# Patient Record
Sex: FEMALE | Race: BLACK OR AFRICAN AMERICAN | NOT HISPANIC OR LATINO | Employment: FULL TIME | URBAN - METROPOLITAN AREA
[De-identification: names, ages, dates, MRNs, and addresses within clinical notes are randomized per-mention and may not be internally consistent; named-entity substitution may affect disease eponyms.]

---

## 2023-07-03 ENCOUNTER — TELEMEDICINE (OUTPATIENT)
Dept: PRIMARY CARE | Facility: CLINIC | Age: 48
End: 2023-07-03
Payer: COMMERCIAL

## 2023-07-03 DIAGNOSIS — F32.A ANXIETY AND DEPRESSION: ICD-10-CM

## 2023-07-03 DIAGNOSIS — Z12.11 COLON CANCER SCREENING: Primary | ICD-10-CM

## 2023-07-03 DIAGNOSIS — G95.0 SYRINGOMYELIA (MULTI): ICD-10-CM

## 2023-07-03 DIAGNOSIS — D57.3 SICKLE-CELL TRAIT (CMS-HCC): ICD-10-CM

## 2023-07-03 DIAGNOSIS — F41.9 ANXIETY AND DEPRESSION: ICD-10-CM

## 2023-07-03 DIAGNOSIS — R10.84 GENERALIZED ABDOMINAL PAIN: ICD-10-CM

## 2023-07-03 DIAGNOSIS — M06.4 INFLAMMATORY POLYARTHRITIS (MULTI): ICD-10-CM

## 2023-07-03 PROCEDURE — 99213 OFFICE O/P EST LOW 20 MIN: CPT | Performed by: INTERNAL MEDICINE

## 2023-07-03 RX ORDER — BUSPIRONE HYDROCHLORIDE 5 MG/1
TABLET ORAL
COMMUNITY
Start: 2023-06-26 | End: 2024-04-19 | Stop reason: WASHOUT

## 2023-07-03 RX ORDER — ESCITALOPRAM OXALATE 20 MG/1
20 TABLET ORAL DAILY
COMMUNITY

## 2023-07-03 RX ORDER — ZOLPIDEM TARTRATE 5 MG/1
5 TABLET ORAL NIGHTLY
COMMUNITY
Start: 2023-06-30

## 2023-07-03 RX ORDER — BUPROPION HYDROCHLORIDE 150 MG/1
150 TABLET ORAL DAILY
COMMUNITY
Start: 2023-06-28

## 2023-07-03 RX ORDER — ERGOCALCIFEROL 1.25 MG/1
50000 CAPSULE ORAL WEEKLY
COMMUNITY
Start: 2014-06-03 | End: 2024-04-19 | Stop reason: WASHOUT

## 2023-07-03 ASSESSMENT — ENCOUNTER SYMPTOMS
BACK PAIN: 0
ARTHRALGIAS: 0
ABDOMINAL PAIN: 1
CHILLS: 0
SHORTNESS OF BREATH: 0
FEVER: 0
PALPITATIONS: 0
DIARRHEA: 1
CONSTIPATION: 0
FATIGUE: 0
DIZZINESS: 0
BLOOD IN STOOL: 0
HEADACHES: 0
NECK PAIN: 0
MYALGIAS: 0
COUGH: 0

## 2023-07-03 NOTE — PROGRESS NOTES
Subjective   Patient ID: Moisés Lambert is a 47 y.o. female.    HPI patient is seen today for complaints of abdominal discomfort and diarrhea.  For last 7 to 8 days she has been noticing watery diarrhea with bile.  She has an appointment to see gastroenterology later in the year.  She was recommended colonoscopy.  Her medical history is significant for lupus, polyarthritis, syringomyelia, anemia.  She complains of constant fatigue.    Review of Systems   Constitutional:  Negative for chills, fatigue and fever.   Respiratory:  Negative for cough and shortness of breath.    Cardiovascular:  Negative for chest pain, palpitations and leg swelling.   Gastrointestinal:  Positive for abdominal pain and diarrhea. Negative for blood in stool and constipation.   Endocrine: Negative for cold intolerance and heat intolerance.   Musculoskeletal:  Negative for arthralgias, back pain, myalgias and neck pain.   Neurological:  Negative for dizziness and headaches.       Objective   Physical Exam  Gen- appears fatigued  Assessment/Plan   There are no diagnoses linked to this encounter.

## 2023-08-11 ENCOUNTER — OFFICE VISIT (OUTPATIENT)
Dept: PRIMARY CARE | Facility: CLINIC | Age: 48
End: 2023-08-11
Payer: COMMERCIAL

## 2023-08-11 VITALS
BODY MASS INDEX: 28.67 KG/M2 | HEART RATE: 98 BPM | SYSTOLIC BLOOD PRESSURE: 132 MMHG | WEIGHT: 167 LBS | DIASTOLIC BLOOD PRESSURE: 81 MMHG | TEMPERATURE: 97.9 F

## 2023-08-11 DIAGNOSIS — Z98.84 HISTORY OF BARIATRIC SURGERY: ICD-10-CM

## 2023-08-11 DIAGNOSIS — Z01.818 PRE-OP EVALUATION: ICD-10-CM

## 2023-08-11 DIAGNOSIS — M06.4 INFLAMMATORY POLYARTHRITIS (MULTI): ICD-10-CM

## 2023-08-11 DIAGNOSIS — D50.8 OTHER IRON DEFICIENCY ANEMIA: Chronic | ICD-10-CM

## 2023-08-11 DIAGNOSIS — F32.89 OTHER DEPRESSION: ICD-10-CM

## 2023-08-11 DIAGNOSIS — F41.9 ANXIETY: ICD-10-CM

## 2023-08-11 DIAGNOSIS — G95.0 SYRINGOMYELIA (MULTI): Primary | ICD-10-CM

## 2023-08-11 PROBLEM — I10 HYPERTENSION: Status: ACTIVE | Noted: 2023-08-11

## 2023-08-11 PROBLEM — G43.009 MIGRAINE WITHOUT AURA AND WITHOUT STATUS MIGRAINOSUS, NOT INTRACTABLE: Status: ACTIVE | Noted: 2023-08-11

## 2023-08-11 PROBLEM — R32 BLADDER INCONTINENCE: Status: ACTIVE | Noted: 2023-08-11

## 2023-08-11 PROBLEM — L43.9 LICHEN PLANUS, UNSPECIFIED: Status: ACTIVE | Noted: 2023-08-11

## 2023-08-11 PROBLEM — E28.2 PCOS (POLYCYSTIC OVARIAN SYNDROME): Status: ACTIVE | Noted: 2023-08-11

## 2023-08-11 PROBLEM — E55.9 VITAMIN D DEFICIENCY: Status: ACTIVE | Noted: 2023-08-11

## 2023-08-11 PROBLEM — E53.8 VITAMIN B12 DEFICIENCY: Status: ACTIVE | Noted: 2023-08-11

## 2023-08-11 PROBLEM — R87.612 PAP SMEAR ABNORMALITY OF CERVIX WITH LGSIL: Status: ACTIVE | Noted: 2023-08-11

## 2023-08-11 PROBLEM — F32.A DEPRESSION: Status: ACTIVE | Noted: 2023-08-11

## 2023-08-11 PROCEDURE — 1036F TOBACCO NON-USER: CPT | Performed by: INTERNAL MEDICINE

## 2023-08-11 PROCEDURE — 3075F SYST BP GE 130 - 139MM HG: CPT | Performed by: INTERNAL MEDICINE

## 2023-08-11 PROCEDURE — 3079F DIAST BP 80-89 MM HG: CPT | Performed by: INTERNAL MEDICINE

## 2023-08-11 PROCEDURE — 99214 OFFICE O/P EST MOD 30 MIN: CPT | Performed by: INTERNAL MEDICINE

## 2023-08-11 ASSESSMENT — ENCOUNTER SYMPTOMS
APPETITE CHANGE: 0
FLANK PAIN: 0
CHEST TIGHTNESS: 0
NECK PAIN: 0
SHORTNESS OF BREATH: 0
CHILLS: 0
HEADACHES: 0
ACTIVITY CHANGE: 0
DYSURIA: 0
BLOOD IN STOOL: 0
WHEEZING: 0
ARTHRALGIAS: 1
SORE THROAT: 0
NERVOUS/ANXIOUS: 0
SLEEP DISTURBANCE: 0
DIZZINESS: 0
WEAKNESS: 0
BACK PAIN: 1
UNEXPECTED WEIGHT CHANGE: 0
PALPITATIONS: 0
COUGH: 0
DIFFICULTY URINATING: 0
DECREASED CONCENTRATION: 0
LIGHT-HEADEDNESS: 0
FREQUENCY: 0
ABDOMINAL PAIN: 0

## 2023-08-11 NOTE — PROGRESS NOTES
Subjective   Patient ID: Moisés Lambert is a 47 y.o. female.    HPI Ms. Lambert is seen today for preop exam.  Her medical history is significant for syringomyelia, left-sided weakness, inflammatory probably arthritis and obesity.  She has history of gastric bypass done with 2 revisions in the past.  She has chronic anemia from gastric bypass and undergoes IV iron therapy.  She has loose skin and fatty tissue for which she is going for cosmetic surgical procedure.  She had blood work done at [facility] which is not available to us today.    Review of Systems   Constitutional:  Negative for activity change, appetite change, chills and unexpected weight change.   HENT:  Negative for congestion, postnasal drip and sore throat.    Eyes:  Negative for visual disturbance.   Respiratory:  Negative for cough, chest tightness, shortness of breath and wheezing.    Cardiovascular:  Negative for chest pain, palpitations and leg swelling.   Gastrointestinal:  Negative for abdominal pain and blood in stool.   Endocrine: Negative for cold intolerance and heat intolerance.   Genitourinary:  Negative for difficulty urinating, dysuria, flank pain and frequency.   Musculoskeletal:  Positive for arthralgias, back pain and gait problem. Negative for neck pain.   Skin:  Negative for rash.   Allergic/Immunologic: Negative for environmental allergies and food allergies.   Neurological:  Negative for dizziness, weakness, light-headedness and headaches.   Psychiatric/Behavioral:  Negative for decreased concentration and sleep disturbance. The patient is not nervous/anxious.        Objective   Physical Exam  Vitals reviewed.   Constitutional:       General: She is not in acute distress.     Appearance: Normal appearance.   HENT:      Head: Normocephalic and atraumatic.   Cardiovascular:      Rate and Rhythm: Normal rate and regular rhythm.      Pulses: Normal pulses.   Pulmonary:      Effort: Pulmonary effort is normal. No respiratory distress.       Breath sounds: Normal breath sounds.   Abdominal:      General: Bowel sounds are normal. There is no distension.      Tenderness: There is no abdominal tenderness.   Musculoskeletal:         General: No swelling or tenderness. Normal range of motion.      Cervical back: Normal range of motion.   Skin:     General: Skin is warm.   Neurological:      General: No focal deficit present.      Mental Status: She is alert.      Coordination: Coordination normal.      Gait: Gait normal.   Psychiatric:         Mood and Affect: Mood normal.         Behavior: Behavior normal.         Assessment/Plan   Diagnoses and all orders for this visit:  Syringomyelia (CMS/HCC)  Comments:  Weakness and difficulty ambulating  Disability placard renewed  Orders:  -     Disability Placard  Inflammatory polyarthritis (CMS/HCC)  Comments:  Stable  Other iron deficiency anemia  Comments:  Currently on iron infusions  Continue to follow-up with hematology  History of bariatric surgery  Comments:  Follow-up as indicated  Anxiety  Comments:  Continue with buspirone, Lexapro  Other depression  Comments:  Continue with Lexapro  Pre-op evaluation  Comments:  Preop evaluation  Awaiting recent lab work  Will fax completed paperwork to the surgery office

## 2023-08-15 ENCOUNTER — APPOINTMENT (OUTPATIENT)
Dept: PRIMARY CARE | Facility: CLINIC | Age: 48
End: 2023-08-15
Payer: MEDICARE

## 2023-08-29 PROBLEM — F40.240 CLAUSTROPHOBIA: Status: ACTIVE | Noted: 2023-08-29

## 2023-08-29 PROBLEM — N80.9 ENDOMETRIOSIS: Status: ACTIVE | Noted: 2023-08-29

## 2023-08-29 PROBLEM — K76.9 LIVER LESION: Status: ACTIVE | Noted: 2023-08-29

## 2023-08-29 PROBLEM — R11.2 NAUSEA AND VOMITING IN ADULT: Status: ACTIVE | Noted: 2023-08-29

## 2023-08-29 PROBLEM — G43.709 MIGRAINE, CHRONIC, WITHOUT AURA: Status: ACTIVE | Noted: 2023-08-29

## 2023-08-29 PROBLEM — R10.9 ABDOMINAL PAIN: Status: ACTIVE | Noted: 2023-08-29

## 2023-08-29 PROBLEM — R29.2 ABNORMAL REFLEX: Status: ACTIVE | Noted: 2023-08-29

## 2023-08-29 PROBLEM — A41.51: Status: ACTIVE | Noted: 2023-08-29

## 2023-08-29 PROBLEM — R91.1 LUNG NODULE: Status: ACTIVE | Noted: 2023-08-29

## 2023-08-29 PROBLEM — G47.00 INSOMNIA: Status: ACTIVE | Noted: 2023-08-29

## 2023-08-29 PROBLEM — G43.901 STATUS MIGRAINOSUS: Status: ACTIVE | Noted: 2023-08-29

## 2023-08-29 PROBLEM — E66.9 OBESITY (BMI 30-39.9): Status: ACTIVE | Noted: 2023-08-29

## 2023-08-29 PROBLEM — N39.0 ESCHERICHIA COLI URINARY TRACT INFECTION: Status: ACTIVE | Noted: 2023-08-29

## 2023-08-29 PROBLEM — R27.9 COORDINATION PROBLEM: Status: ACTIVE | Noted: 2023-08-29

## 2023-08-29 PROBLEM — N10: Status: ACTIVE | Noted: 2023-08-29

## 2023-08-29 PROBLEM — N39.0 UTI (URINARY TRACT INFECTION): Status: ACTIVE | Noted: 2023-08-29

## 2023-08-29 PROBLEM — M67.40 GANGLION: Status: ACTIVE | Noted: 2023-08-29

## 2023-08-29 PROBLEM — B96.20 ESCHERICHIA COLI URINARY TRACT INFECTION: Status: ACTIVE | Noted: 2023-08-29

## 2023-08-29 PROBLEM — K29.70 GASTRITIS: Status: ACTIVE | Noted: 2023-08-29

## 2023-08-29 PROBLEM — N12 PYELONEPHRITIS: Status: ACTIVE | Noted: 2023-08-29

## 2023-08-29 PROBLEM — R41.3 COMPLAINTS OF MEMORY DISTURBANCE: Status: ACTIVE | Noted: 2023-08-29

## 2023-08-29 PROBLEM — B97.7 HPV IN FEMALE: Status: ACTIVE | Noted: 2023-08-29

## 2023-08-29 RX ORDER — ESZOPICLONE 2 MG/1
2 TABLET, FILM COATED ORAL NIGHTLY
COMMUNITY

## 2023-08-29 RX ORDER — SYRINGE WITH NEEDLE, 1 ML 25GX5/8"
SYRINGE, EMPTY DISPOSABLE MISCELLANEOUS
COMMUNITY
Start: 2023-06-26

## 2023-08-29 RX ORDER — AZELASTINE 1 MG/ML
2 SPRAY, METERED NASAL DAILY
COMMUNITY

## 2023-08-29 RX ORDER — POLYETHYLENE GLYCOL 3350, SODIUM CHLORIDE, SODIUM BICARBONATE, POTASSIUM CHLORIDE 420; 11.2; 5.72; 1.48 G/4L; G/4L; G/4L; G/4L
4000 POWDER, FOR SOLUTION ORAL ONCE
COMMUNITY
Start: 2023-07-03

## 2023-08-29 RX ORDER — CYPROHEPTADINE HYDROCHLORIDE 4 MG/1
TABLET ORAL
COMMUNITY

## 2023-08-29 RX ORDER — FLUTICASONE PROPIONATE 50 MCG
1 SPRAY, SUSPENSION (ML) NASAL DAILY
COMMUNITY

## 2023-08-29 RX ORDER — LISINOPRIL 10 MG/1
20 TABLET ORAL
COMMUNITY
End: 2024-04-19 | Stop reason: SDUPTHER

## 2023-08-29 RX ORDER — LORAZEPAM 0.5 MG/1
0.5 TABLET ORAL EVERY 6 HOURS PRN
COMMUNITY
Start: 2014-06-03

## 2023-08-29 RX ORDER — FERROUS GLUCONATE 325 MG
38 TABLET ORAL
COMMUNITY

## 2023-08-29 RX ORDER — CHLORHEXIDINE GLUCONATE ORAL RINSE 1.2 MG/ML
15 SOLUTION DENTAL AS NEEDED
COMMUNITY
End: 2023-12-01 | Stop reason: SDUPTHER

## 2023-09-08 RX ORDER — DIPHENHYDRAMINE HCL 25 MG
25 CAPSULE ORAL EVERY 4 HOURS PRN
COMMUNITY
Start: 2023-08-30 | End: 2023-09-14

## 2023-09-08 RX ORDER — ACETAMINOPHEN 325 MG/1
650 TABLET ORAL EVERY 6 HOURS PRN
COMMUNITY
Start: 2023-08-30 | End: 2023-09-29

## 2023-09-18 ENCOUNTER — TELEMEDICINE (OUTPATIENT)
Dept: PRIMARY CARE | Facility: CLINIC | Age: 48
End: 2023-09-18
Payer: COMMERCIAL

## 2023-09-18 DIAGNOSIS — F32.89 OTHER DEPRESSION: ICD-10-CM

## 2023-09-18 DIAGNOSIS — G56.22 ULNAR NEUROPATHY OF LEFT UPPER EXTREMITY: Primary | ICD-10-CM

## 2023-09-18 PROCEDURE — 99213 OFFICE O/P EST LOW 20 MIN: CPT | Performed by: INTERNAL MEDICINE

## 2023-09-18 ASSESSMENT — ENCOUNTER SYMPTOMS
ABDOMINAL PAIN: 0
DIARRHEA: 0
SHORTNESS OF BREATH: 0
COUGH: 0
CHILLS: 0
ARTHRALGIAS: 0
CONSTIPATION: 0
BLOOD IN STOOL: 0
DIZZINESS: 0
PALPITATIONS: 0
HEADACHES: 0
BACK PAIN: 0
NECK PAIN: 0
MYALGIAS: 0
NUMBNESS: 1
FEVER: 0
FATIGUE: 0

## 2023-09-18 NOTE — PROGRESS NOTES
Subjective   Patient ID: Moisés Lambert is a 48 y.o. female.    HPI patient is seen today for virtual visit for tingling and numbness in her left arm, fourth and fifth finger, pain and discomfort in the elbow since she had liposuction surgery 4 weeks ago in [place].  She states that her symptoms are not improving.  She contacted the surgeon who stated that she had a pinched nerve.  She complains of pain and discomfort and otherwise feels well.    Review of Systems   Constitutional:  Negative for chills, fatigue and fever.   Respiratory:  Negative for cough and shortness of breath.    Cardiovascular:  Negative for chest pain, palpitations and leg swelling.   Gastrointestinal:  Negative for abdominal pain, blood in stool, constipation and diarrhea.   Endocrine: Negative for cold intolerance and heat intolerance.   Musculoskeletal:  Negative for arthralgias, back pain, myalgias and neck pain.   Neurological:  Positive for numbness. Negative for dizziness and headaches.       Objective   Physical Exam  Constitutional:       Appearance: Normal appearance.   Pulmonary:      Effort: Pulmonary effort is normal.   Neurological:      Mental Status: She is alert.   Psychiatric:         Mood and Affect: Mood normal.         Thought Content: Thought content normal.         Assessment/Plan   Diagnoses and all orders for this visit:  Ulnar neuropathy of left upper extremity  Comments:  Left arm, fingers numbness due to ulnar entrapment neuropathy post procedure  Patient to monitor her symptoms  Make appointment with neurology  Orders:  -     Referral to Neurology; Future  Other depression  Comments:  Continue with current medication

## 2023-10-05 ENCOUNTER — APPOINTMENT (OUTPATIENT)
Dept: GASTROENTEROLOGY | Facility: CLINIC | Age: 48
End: 2023-10-05
Payer: MEDICARE

## 2023-10-16 ENCOUNTER — APPOINTMENT (OUTPATIENT)
Dept: HEMATOLOGY/ONCOLOGY | Facility: CLINIC | Age: 48
End: 2023-10-16
Payer: COMMERCIAL

## 2023-11-07 ENCOUNTER — APPOINTMENT (OUTPATIENT)
Dept: NEUROLOGY | Facility: CLINIC | Age: 48
End: 2023-11-07
Payer: MEDICARE

## 2023-11-08 DIAGNOSIS — D50.9 IRON DEFICIENCY ANEMIA, UNSPECIFIED IRON DEFICIENCY ANEMIA TYPE: Chronic | ICD-10-CM

## 2023-11-09 ENCOUNTER — LAB (OUTPATIENT)
Dept: LAB | Facility: CLINIC | Age: 48
End: 2023-11-09
Payer: COMMERCIAL

## 2023-11-09 DIAGNOSIS — D64.9 ANEMIA, UNSPECIFIED TYPE: Primary | ICD-10-CM

## 2023-11-09 DIAGNOSIS — D64.9 ANEMIA, UNSPECIFIED TYPE: ICD-10-CM

## 2023-11-09 LAB
BASOPHILS # BLD AUTO: 0.01 X10*3/UL (ref 0–0.1)
BASOPHILS NFR BLD AUTO: 0.3 %
EOSINOPHIL # BLD AUTO: 0.04 X10*3/UL (ref 0–0.7)
EOSINOPHIL NFR BLD AUTO: 1.1 %
ERYTHROCYTE [DISTWIDTH] IN BLOOD BY AUTOMATED COUNT: 13.3 % (ref 11.5–14.5)
FERRITIN SERPL-MCNC: 56 NG/ML (ref 8–150)
FOLATE SERPL-MCNC: 9 NG/ML
HCT VFR BLD AUTO: 38 % (ref 36–46)
HGB BLD-MCNC: 12.4 G/DL (ref 12–16)
IMM GRANULOCYTES # BLD AUTO: 0.01 X10*3/UL (ref 0–0.7)
IMM GRANULOCYTES NFR BLD AUTO: 0.3 % (ref 0–0.9)
IRON SATN MFR SERPL: 13 % (ref 25–45)
IRON SERPL-MCNC: 52 UG/DL (ref 35–150)
LYMPHOCYTES # BLD AUTO: 1.39 X10*3/UL (ref 1.2–4.8)
LYMPHOCYTES NFR BLD AUTO: 39.8 %
MCH RBC QN AUTO: 28.1 PG (ref 26–34)
MCHC RBC AUTO-ENTMCNC: 32.6 G/DL (ref 32–36)
MCV RBC AUTO: 86 FL (ref 80–100)
MONOCYTES # BLD AUTO: 0.32 X10*3/UL (ref 0.1–1)
MONOCYTES NFR BLD AUTO: 9.2 %
NEUTROPHILS # BLD AUTO: 1.72 X10*3/UL (ref 1.2–7.7)
NEUTROPHILS NFR BLD AUTO: 49.3 %
NRBC BLD-RTO: ABNORMAL /100{WBCS}
PLATELET # BLD AUTO: 295 X10*3/UL (ref 150–450)
RBC # BLD AUTO: 4.41 X10*6/UL (ref 4–5.2)
TIBC SERPL-MCNC: 387 UG/DL (ref 240–445)
UIBC SERPL-MCNC: 335 UG/DL (ref 110–370)
VIT B12 SERPL-MCNC: 518 PG/ML (ref 211–911)
WBC # BLD AUTO: 3.5 X10*3/UL (ref 4.4–11.3)

## 2023-11-09 PROCEDURE — 82746 ASSAY OF FOLIC ACID SERUM: CPT | Performed by: NURSE PRACTITIONER

## 2023-11-09 PROCEDURE — 82607 VITAMIN B-12: CPT | Performed by: NURSE PRACTITIONER

## 2023-11-09 PROCEDURE — 85025 COMPLETE CBC W/AUTO DIFF WBC: CPT

## 2023-11-09 PROCEDURE — 36415 COLL VENOUS BLD VENIPUNCTURE: CPT

## 2023-11-09 PROCEDURE — 82728 ASSAY OF FERRITIN: CPT | Performed by: NURSE PRACTITIONER

## 2023-11-09 PROCEDURE — 83550 IRON BINDING TEST: CPT | Performed by: NURSE PRACTITIONER

## 2023-11-15 ENCOUNTER — TELEPHONE (OUTPATIENT)
Dept: HEMATOLOGY/ONCOLOGY | Facility: HOSPITAL | Age: 48
End: 2023-11-15

## 2023-11-15 ENCOUNTER — OFFICE VISIT (OUTPATIENT)
Dept: HEMATOLOGY/ONCOLOGY | Facility: CLINIC | Age: 48
End: 2023-11-15
Payer: COMMERCIAL

## 2023-11-15 DIAGNOSIS — K90.9 MALABSORPTION OF IRON (HHS-HCC): Primary | ICD-10-CM

## 2023-11-15 DIAGNOSIS — Z98.84 HISTORY OF BARIATRIC SURGERY: ICD-10-CM

## 2023-11-15 DIAGNOSIS — E53.8 VITAMIN B12 DEFICIENCY: ICD-10-CM

## 2023-11-15 DIAGNOSIS — D50.9 IRON DEFICIENCY ANEMIA, UNSPECIFIED IRON DEFICIENCY ANEMIA TYPE: Chronic | ICD-10-CM

## 2023-11-15 PROCEDURE — 99443 PR PHYS/QHP TELEPHONE EVALUATION 21-30 MIN: CPT | Performed by: NURSE PRACTITIONER

## 2023-11-15 PROCEDURE — 1036F TOBACCO NON-USER: CPT | Performed by: NURSE PRACTITIONER

## 2023-11-15 RX ORDER — ALBUTEROL SULFATE 0.83 MG/ML
3 SOLUTION RESPIRATORY (INHALATION) AS NEEDED
Status: CANCELLED | OUTPATIENT
Start: 2023-12-05

## 2023-11-15 RX ORDER — FAMOTIDINE 10 MG/ML
20 INJECTION INTRAVENOUS ONCE AS NEEDED
Status: CANCELLED | OUTPATIENT
Start: 2023-12-05

## 2023-11-15 RX ORDER — DIPHENHYDRAMINE HYDROCHLORIDE 50 MG/ML
50 INJECTION INTRAMUSCULAR; INTRAVENOUS ONCE
Status: CANCELLED | OUTPATIENT
Start: 2023-12-05 | End: 2023-12-05

## 2023-11-15 RX ORDER — EPINEPHRINE 0.3 MG/.3ML
0.3 INJECTION SUBCUTANEOUS EVERY 5 MIN PRN
Status: CANCELLED | OUTPATIENT
Start: 2023-12-05

## 2023-11-15 NOTE — PROGRESS NOTES
Patient to receive 2 doses of IV Iron at [facility] with premedications.  Scheduling orders entered by provider.  Follow-up with Margarita MACKENZIE in 6 months.  Call back instructions reviewed.  Patient verbalized understanding.

## 2023-11-15 NOTE — PROGRESS NOTES
Chief Complaint: anemia   Interval History:    Location: [facility]  Initial consult: 12/15/22  Reason: anemia  DX: PJ, b12 def, SC trait. Malabsorption s/p gastric bypass  TX: IV iron, B12 subcutaneous    Verbal consent was requested and obtained from patient on this date for a telehealth visit.       Patient is a 48 yo female with a PMH of PJ,  SC trait, PUD, malabsorption s/p gastric bypass (x3), PCOS  s/p hysterectomy, htn, migraines, depression, lichen planus, syringomyelia and  was referred to benign hematology for consultation of anemia hx receiving IV iron. Referred by Dr. Alcala.     Today, patient presents for followup via phone.     S/p 3 doses IV iron and 1 blood transfusion 3/2023. Had itchy rash with blood transfusion but pt reports she has had issues with itchy rashes (intermittent) for years - was given benadryl and steroids IV  w/improvement. Had itch rashes with IV Venofer - PO benadryl did not improve so was given IV Benadryl 25mg with good relief. Does tell me today that she had significant itching even with 25mg IV benadryl with last IV iron - went home and did not tell anyone - kept taking PO benadryl and eventually resolved.     She is feeling tired again. No abnormal bleeding, no melena. Brusies easily but nothing severe.     Recent hospitalization for complications s/p liposuction 8/2023.    B12 injections monthly at home w/o issue. Never started folic acid.      Recall - Chronic GI issues - since 3rd gastric sx, stools are liquid and yellow or sludge - has bouts of pancreatitis. Also has hemorrhoids but do not bleed much. Has not followed with GI for many years.  Has had multiple blood transfusions in the past per pt  and has had IV iron in past.     Has had surgery in past w/o issue.      SH: Diet - reg, Tobacco - no , ETOH - no , Rec drug use - no . Radiation exposure - no .  PSH: gastric bypass x3, hysterectomy, gallbladder  FH: hep c, breast ca, ovarian ca.  Daughter with anemia  "  Meds: see list      Review of Systems:   Review of Systems:    10 point review of systems negative except as state in HPI.         Allergies and Intolerances:       Allergies:         Demerol HCl: Drug, Itching, Active     Outpatient Medication Profile:  * Patient Currently Takes Medications as of 02-Mar-2023 12:30 documented in Structured Notes         cyanocobalamin 1000 mcg/mL injectable solution : 1000 microgram(s) subcutaneous once a month. please dispense appropriate number of 3ml syringes with 25g 5/8\" needles , Start Date: 02-Mar-2023         please dispense appropriate number of 3ml syringes with 25g 5/8\" needles : For Vitamin B12 injections, Start Date: 19-Dec-2022         Alcohol Pads (50 each): Use topically prior to testing or injecting Vitamin  B12., Start Date: 19-Dec-2022         cyclobenzaprine 10 mg oral tablet: 1 tab(s) orally once a day, Start Date:  28-Jun-2017         buPROPion 300 mg/24 hours (XL) oral tablet, extended release: 1 tab(s)  orally every 24 hours, Start Date: 26-Jun-2017         lisinopril 20 mg oral tablet: 1 tab(s) orally once a day         Vitamin D2 50 mcg (2000 intl units) oral capsule: 1 cap(s) orally once  a day         escitalopram 20 mg oral tablet: 1 tab(s) orally once a day         eszopiclone 3 mg oral tablet: 1 tab(s) orally once a day (at bedtime)         ferrous gluconate 86 mg oral capsule:              Medical History:         History of robot-assisted laparoscopic hysterectomy: ICD-10:  Z98.89, Status: Active         HTN (hypertension): ICD-10: I10, Status: Active         Iron deficiency anemia: ICD-10: D50.9, Status: Active         PUD (peptic ulcer disease): ICD-10: K27.9, Status: Active         S/P hysterectomy: ICD-10: Z90.710, Status: Active         PCOS (polycystic ovarian syndrome): ICD-10: E28.2, Status:  Active         Depression: ICD-10: F32.9, Status: Active         Migraine: ICD-10: G43.909, Status: Active         H/O gastric bypass: ICD-10: " Z98.890, Status: Active         Endometriosis: ICD-10: N80.9, Status: Active        Social History:   Social Substance History:  ·  Smoking Status never smoker (1)      Physical Exam:      Constitutional: nad   Respiratory/Thorax: breathing easily   Musculoskeletal: ROM intact   Neurological: aox3   Psychological: Pleasant, appropriate, and easily  engaged         Lab Results:     ·  Results        CBC date/time       WBC     HGB     HCT     PLT     Neut      02-Mar-2023 13:08   5.7     11.4(L) 36.4    210     N/A  19-Jan-2023 14:47   4.9     10.1(L) 34.7(L) 302     N/A  12-Jan-2023 10:14   4.5     6.9(L)  24.2(L) 305     N/A  19-Dec-2022 14:40   5.5     7.6(L)  27.3(L) 449     3.72  26-Aug-2022 11:51   4.7     8.0(L)  28.5(L) 422     N/A  30-Aug-2018 16:33   4.9     11.2(L) 34.7(L) 272     2.90  16-Dec-2017 04:47   3.0(L)  9.9(L)  31.8(L) 308     1.00(L)     BMP date/time       NA              K               CL              CO2             BUN             CREAT             26-Aug-2022 11:51   138             4.8             105             N/A             10              0.66  30-Aug-2018 16:33   137             4.0             107             N/A             10              0.75  16-Dec-2017 04:47   140             3.8             108(H)          N/A             8               0.63  15-Dec-2017 16:25   140             3.6             107             N/A             9               0.67  11-Dec-2017 21:15   137             3.2(L)          104             N/A             7               0.62  23-Jul-2017 06:40   139             3.3(L)          106             N/A             6               0.56  22-Jul-2017 10:16   138             3.6             106             N/A             6               0.67     Hepatic date/time   T Pro   T Bili  AST     ALT     ALKP    ALB       22-Jun-2017 05:50   6.8     0.3     30      58(H)   95      3.9  11-Apr-2016 18:32   7.7     0.4     54(H)   18      64      4.0     LDH  date/time       LDH     19-Dec-2022 14:41   193  16-Feb-2017 10:26   N/A        I have reviewed these laboratory results:     Complete Blood Count  T[facility]ing [facility]       Result 02-Mar-2023 13:08:00  19-Jan-2023 14:47:00  12-Jan-2023 10:14:00    White Blood Cell Count 5.7   4.9   4.5    Red Blood Cell Count 4.55   4.70   3.54   L    HGB 11.4   L   10.1   L   6.9   L    HCT 36.4   34.7   L   24.2   L    MCV 80   74   L   68   L    MCHC 31.3   L   29.1   L   28.5   L       302   305    RDW-CV 23.5   H   22.8   H   17.0   H        Iron + TIBC, Serum  02-Mar-2023 13:08:00       Result Value    Iron, Serum  88    Total Iron Binding Capacity  355    % Saturation  25       Ferritin, Serum  02-Mar-2023 13:08:00       Result Value    Ferritin, Serum  98          Assessment and Plan:      Assessment and Plan:   Assessment:    Location: [facility]  Initial consult: 12/15/22  Reason: anemia  DX: PJ, b12 def, SC trait. Malabsorption s/p gastric bypass  TX: IV iron, B12 SQ, folic acid PO     Patient is a 46 yo female with a PMH of PJ, PUD,  malabsorption s/p gastric bypass, PCOS s/p hysterectomy, htn, migraines,  depression, lichen planus, syringomyelia and was referred to benign hematology for consultation of anemia - hx receiving IV iron. Referred by  Dr. Alcala.     Today, patient presents for initial followup virtually.      I reviewed patient's chart including but not limited to labs, imaging, surgical/procedure notes, pathology, hospital notes, doctor's notes. Has had chronic mild to moderate anemia essentially since 2008. Microcytic to normocytic anemia. Known PJ, b12  deficiency, and malabsorption s/p gastric bypass. HX AUB but none since hysterectomy. Very mild hemorrhoid bleeding at times, no other abnormal bruising or bleeding. Taking oral iron but increases GI distress, pain, constipation and reports has trialed  in the past multiple times w/o any change in anemia/iron studies.     Discussed will likely  require lifelong IV iron d/t malabsorption s/p gastric bypass. Will likely also require B12 and folate supplementation lifelong. Will start with SQ B12 injections as she has mouth pain from her lichen planus right now. Will hold off folate until mouth heals better - then start folate gummies as pills go straight through her.     3/2023 - S/p 3 doses IV iron and 1 blood transfusion 3/2023. Had itchy rash with blood transfusion but pt reports she has had issues with itchy rashes (intermittent) for years - was given benadryl and steroids IV  w/improvement. Had itch rashes with IV Venofer - PO benadryl did not improve so was given IV Benadryl 25mg with good relief. Does tell me today that she had significant itching even with 25mg IV benadryl with last IV iron - went home and did not tell anyone - kept taking PO benadryl and eventually resolved.           - Hgb has improved from 6.9 to 11.4. Iron studies normal now, ferritin in 90s.           - Reports saw derm in past for rashes but never followed up. Will have her follow up with PCP to help determine if specialist needed    11/15/23 - Recent labs reveal mild iron deficiency - ferritin 56, %sat 13. Hgb 12.4. B12 518. Discussed and will go ahead and give more IV iron to treat iron def and replenish stores. Discussed importance of telling us and seeking help for SE from IV iron. Discussed concern for potential allergic reaction in future. After discussion, will give Benadryl 50mg IV before each IV iron. Pt aware she needs to have someone drive [place].       IV iron without premedication, observe patient for hypersensitivity reaction for 30 minutes post infusion. Side effects of IV iron were explained, including but are not limited to hypotension, edema, chest pain, hypertension, dizziness, headache, fatigue, pruritus, rash, diarrhea, nausea, constipation, vomiting, abdominal pain, hypersensitivity reaction which can be severe and potentially  life-threatening, pain, muscle spasm, shortness of breath, cough, fever, anaphylaxis, angioedema, arrhythmia, cardiac failure, ischemic heart disease, syncope, urticaria-patient agrees to proceed if needed.      Plan  1. FUV  6mo with labs 1 week before appt (cbcd, iron studies, b12, folate). Labs in EMR. Will discuss lab results/recommendations at next appt unless something needs immediate follow-up   2. Continue Vit B12 1mg SQ q4wks. RX sent to pharmacy.   3. IV Venofer 300mg x2 doses in the next month. To get Benadryl 50mg IV as pre-med.   4. Discussed referral to GI but pt declined  5. F/U with PCP about rashes  6. Consented for blood 12/2022 - blood standing orders in paper chart. Will need premed with steroids along with IV benadryl and tylenol in future     I had an extensive discussion with the patient regarding the diagnosis and discussed the plan of therapy, including general considerations regarding side effects and outcomes. Pt understood and gave appropriate teach back about the plan of care. All questions  were answered to the patient's satisfaction. The patient is instructed to contact us at any time if questions or problems arise. Thank you for the opportunity to participate in the care of this very pleasant patient.     Total time =30 minutes. 50% or more of this time was spent in counseling and/or coordination of care including reviewing medical history/radiology/labs, examining patient, formulating outlined plan with team, and discussing plan with patient/family.

## 2023-11-16 RX ORDER — CYANOCOBALAMIN 1000 UG/ML
1000 INJECTION, SOLUTION INTRAMUSCULAR; SUBCUTANEOUS
Qty: 3 ML | Refills: 3 | Status: SHIPPED | OUTPATIENT
Start: 2023-11-16 | End: 2024-11-15

## 2023-12-01 DIAGNOSIS — J02.9 SORE THROAT: Primary | ICD-10-CM

## 2023-12-01 RX ORDER — CHLORHEXIDINE GLUCONATE ORAL RINSE 1.2 MG/ML
15 SOLUTION DENTAL AS NEEDED
Qty: 120 ML | Refills: 1 | Status: SHIPPED | OUTPATIENT
Start: 2023-12-01 | End: 2024-02-26

## 2023-12-05 ENCOUNTER — INFUSION (OUTPATIENT)
Dept: HEMATOLOGY/ONCOLOGY | Facility: CLINIC | Age: 48
End: 2023-12-05
Payer: COMMERCIAL

## 2023-12-05 VITALS
TEMPERATURE: 98.6 F | OXYGEN SATURATION: 97 % | HEART RATE: 77 BPM | DIASTOLIC BLOOD PRESSURE: 84 MMHG | RESPIRATION RATE: 18 BRPM | SYSTOLIC BLOOD PRESSURE: 133 MMHG

## 2023-12-05 DIAGNOSIS — Z98.84 HISTORY OF BARIATRIC SURGERY: ICD-10-CM

## 2023-12-05 DIAGNOSIS — D50.9 IRON DEFICIENCY ANEMIA, UNSPECIFIED IRON DEFICIENCY ANEMIA TYPE: ICD-10-CM

## 2023-12-05 DIAGNOSIS — K90.9 MALABSORPTION OF IRON (HHS-HCC): ICD-10-CM

## 2023-12-05 PROCEDURE — 2500000004 HC RX 250 GENERAL PHARMACY W/ HCPCS (ALT 636 FOR OP/ED): Performed by: NURSE PRACTITIONER

## 2023-12-05 PROCEDURE — 96366 THER/PROPH/DIAG IV INF ADDON: CPT | Mod: INF

## 2023-12-05 PROCEDURE — 2500000004 HC RX 250 GENERAL PHARMACY W/ HCPCS (ALT 636 FOR OP/ED): Performed by: PHYSICIAN ASSISTANT

## 2023-12-05 PROCEDURE — 96375 TX/PRO/DX INJ NEW DRUG ADDON: CPT | Mod: INF

## 2023-12-05 PROCEDURE — 96365 THER/PROPH/DIAG IV INF INIT: CPT | Mod: INF

## 2023-12-05 RX ORDER — ALBUTEROL SULFATE 0.83 MG/ML
3 SOLUTION RESPIRATORY (INHALATION) AS NEEDED
Status: CANCELLED | OUTPATIENT
Start: 2023-12-12

## 2023-12-05 RX ORDER — DIPHENHYDRAMINE HYDROCHLORIDE 50 MG/ML
50 INJECTION INTRAMUSCULAR; INTRAVENOUS ONCE
Status: CANCELLED | OUTPATIENT
Start: 2023-12-12 | End: 2023-12-12

## 2023-12-05 RX ORDER — FAMOTIDINE 10 MG/ML
20 INJECTION INTRAVENOUS ONCE AS NEEDED
Status: CANCELLED | OUTPATIENT
Start: 2023-12-12

## 2023-12-05 RX ORDER — EPINEPHRINE 0.3 MG/.3ML
0.3 INJECTION SUBCUTANEOUS EVERY 5 MIN PRN
Status: DISCONTINUED | OUTPATIENT
Start: 2023-12-05 | End: 2023-12-05 | Stop reason: HOSPADM

## 2023-12-05 RX ORDER — EPINEPHRINE 0.3 MG/.3ML
0.3 INJECTION SUBCUTANEOUS EVERY 5 MIN PRN
Status: CANCELLED | OUTPATIENT
Start: 2023-12-12

## 2023-12-05 RX ORDER — DIPHENHYDRAMINE HYDROCHLORIDE 50 MG/ML
50 INJECTION INTRAMUSCULAR; INTRAVENOUS ONCE
Status: COMPLETED | OUTPATIENT
Start: 2023-12-05 | End: 2023-12-05

## 2023-12-05 RX ORDER — FAMOTIDINE 10 MG/ML
20 INJECTION INTRAVENOUS ONCE AS NEEDED
Status: DISCONTINUED | OUTPATIENT
Start: 2023-12-05 | End: 2023-12-05 | Stop reason: HOSPADM

## 2023-12-05 RX ORDER — ALBUTEROL SULFATE 0.83 MG/ML
3 SOLUTION RESPIRATORY (INHALATION) AS NEEDED
Status: DISCONTINUED | OUTPATIENT
Start: 2023-12-05 | End: 2023-12-05 | Stop reason: HOSPADM

## 2023-12-05 RX ADMIN — DIPHENHYDRAMINE HYDROCHLORIDE 50 MG: 50 INJECTION, SOLUTION INTRAMUSCULAR; INTRAVENOUS at 09:26

## 2023-12-05 RX ADMIN — IRON SUCROSE 300 MG: 20 INJECTION, SOLUTION INTRAVENOUS at 10:48

## 2023-12-05 RX ADMIN — HYDROCORTISONE SODIUM SUCCINATE 100 MG: 100 INJECTION, POWDER, FOR SOLUTION INTRAMUSCULAR; INTRAVENOUS at 10:19

## 2023-12-05 NOTE — PROGRESS NOTES
Pt completed IV iron infusion successfully, VSS, IV patent with good blood return despite IV discomfort... Pt developed flash reaction after IV benadryl, vein red and tender from hand to upper FA after administration, team updated, hydrocortisone given to alleviate symptoms: decrease redness, irritation to IV site.  Heat packs were effective at decreasing tenderness to site.  No swelling or redness after steroid and NS flushing.  Pt opted to continue with IV, painful and tender but tolerable to sustain throughout Iron infusion.  Communication with pt, team and nursing team about IV...  Noted that IV benadryl be given and flushed thoroughly next Tuesday 12/12/23 before receiving Iron..  Benadryl given IV push and should be followed by multiple syringe flushes to clear veins.  Pt agreeable to care plan and was comfortable with today's conclusions, plans to receive tx next week, no further concerns or questions at this time.

## 2023-12-12 ENCOUNTER — INFUSION (OUTPATIENT)
Dept: HEMATOLOGY/ONCOLOGY | Facility: CLINIC | Age: 48
End: 2023-12-12
Payer: COMMERCIAL

## 2023-12-12 VITALS
TEMPERATURE: 98.2 F | OXYGEN SATURATION: 98 % | WEIGHT: 177.91 LBS | HEART RATE: 91 BPM | BODY MASS INDEX: 30.56 KG/M2 | RESPIRATION RATE: 18 BRPM | SYSTOLIC BLOOD PRESSURE: 120 MMHG | DIASTOLIC BLOOD PRESSURE: 85 MMHG

## 2023-12-12 DIAGNOSIS — D50.9 IRON DEFICIENCY ANEMIA, UNSPECIFIED IRON DEFICIENCY ANEMIA TYPE: ICD-10-CM

## 2023-12-12 DIAGNOSIS — D50.8 OTHER IRON DEFICIENCY ANEMIA: ICD-10-CM

## 2023-12-12 DIAGNOSIS — K90.9 MALABSORPTION OF IRON (HHS-HCC): ICD-10-CM

## 2023-12-12 DIAGNOSIS — Z98.84 HISTORY OF BARIATRIC SURGERY: ICD-10-CM

## 2023-12-12 PROCEDURE — 2500000004 HC RX 250 GENERAL PHARMACY W/ HCPCS (ALT 636 FOR OP/ED): Performed by: NURSE PRACTITIONER

## 2023-12-12 PROCEDURE — 96375 TX/PRO/DX INJ NEW DRUG ADDON: CPT | Mod: INF

## 2023-12-12 PROCEDURE — 96366 THER/PROPH/DIAG IV INF ADDON: CPT | Mod: INF

## 2023-12-12 PROCEDURE — 96365 THER/PROPH/DIAG IV INF INIT: CPT | Mod: INF

## 2023-12-12 RX ORDER — DIPHENHYDRAMINE HYDROCHLORIDE 50 MG/ML
50 INJECTION INTRAMUSCULAR; INTRAVENOUS ONCE
Status: CANCELLED | OUTPATIENT
Start: 2023-12-12 | End: 2023-12-12

## 2023-12-12 RX ORDER — EPINEPHRINE 0.3 MG/.3ML
0.3 INJECTION SUBCUTANEOUS EVERY 5 MIN PRN
OUTPATIENT
Start: 2023-12-12

## 2023-12-12 RX ORDER — ALBUTEROL SULFATE 0.83 MG/ML
3 SOLUTION RESPIRATORY (INHALATION) AS NEEDED
OUTPATIENT
Start: 2023-12-12

## 2023-12-12 RX ORDER — DIPHENHYDRAMINE HYDROCHLORIDE 50 MG/ML
50 INJECTION INTRAMUSCULAR; INTRAVENOUS ONCE
Status: COMPLETED | OUTPATIENT
Start: 2023-12-12 | End: 2023-12-12

## 2023-12-12 RX ORDER — EPINEPHRINE 0.3 MG/.3ML
0.3 INJECTION SUBCUTANEOUS EVERY 5 MIN PRN
Status: DISCONTINUED | OUTPATIENT
Start: 2023-12-12 | End: 2023-12-12 | Stop reason: HOSPADM

## 2023-12-12 RX ORDER — FAMOTIDINE 10 MG/ML
20 INJECTION INTRAVENOUS ONCE AS NEEDED
Status: DISCONTINUED | OUTPATIENT
Start: 2023-12-12 | End: 2023-12-12 | Stop reason: HOSPADM

## 2023-12-12 RX ORDER — FAMOTIDINE 10 MG/ML
20 INJECTION INTRAVENOUS ONCE AS NEEDED
OUTPATIENT
Start: 2023-12-12

## 2023-12-12 RX ORDER — ALBUTEROL SULFATE 0.83 MG/ML
3 SOLUTION RESPIRATORY (INHALATION) AS NEEDED
Status: DISCONTINUED | OUTPATIENT
Start: 2023-12-12 | End: 2023-12-12 | Stop reason: HOSPADM

## 2023-12-12 RX ADMIN — DIPHENHYDRAMINE HYDROCHLORIDE 50 MG: 50 INJECTION, SOLUTION INTRAMUSCULAR; INTRAVENOUS at 08:08

## 2023-12-12 RX ADMIN — IRON SUCROSE 300 MG: 20 INJECTION, SOLUTION INTRAVENOUS at 08:34

## 2023-12-12 ASSESSMENT — PAIN SCALES - GENERAL: PAINLEVEL: 5

## 2023-12-12 NOTE — PROGRESS NOTES
Patient had mild flare reaction to veins (redness only, no swelling) above her PIV after slow push IV benadryl. She reported mild discomfort at this insertion site that quickly subsided with saline infusion and PIV patent with +BBR. The redness tracking her veins above her PIV subsided with saline flush and prior to start of iron. Pt reports it was not as significant as last infusion. Pt had some aching/ discomfort with the infusion but subsided with heat packs. Pt did not want to move the IV as it was patent and tolerable. Discussed communication with her provider and potentially changing benadryl to PO and other iron options for the future. Pt verbalized understanding.

## 2023-12-14 ENCOUNTER — TELEPHONE (OUTPATIENT)
Dept: HEMATOLOGY/ONCOLOGY | Facility: CLINIC | Age: 48
End: 2023-12-14
Payer: COMMERCIAL

## 2023-12-14 DIAGNOSIS — E53.8 VITAMIN B12 DEFICIENCY: ICD-10-CM

## 2024-02-08 ENCOUNTER — APPOINTMENT (OUTPATIENT)
Dept: NEUROLOGY | Facility: CLINIC | Age: 49
End: 2024-02-08
Payer: MEDICARE

## 2024-02-26 DIAGNOSIS — J02.9 SORE THROAT: ICD-10-CM

## 2024-02-26 RX ORDER — CHLORHEXIDINE GLUCONATE ORAL RINSE 1.2 MG/ML
SOLUTION DENTAL
Qty: 473 ML | Refills: 0 | Status: SHIPPED | OUTPATIENT
Start: 2024-02-26 | End: 2024-03-25

## 2024-03-25 DIAGNOSIS — J02.9 SORE THROAT: ICD-10-CM

## 2024-03-25 RX ORDER — CHLORHEXIDINE GLUCONATE ORAL RINSE 1.2 MG/ML
SOLUTION DENTAL
Qty: 473 ML | Refills: 0 | Status: SHIPPED | OUTPATIENT
Start: 2024-03-25

## 2024-04-19 ENCOUNTER — OFFICE VISIT (OUTPATIENT)
Dept: PRIMARY CARE | Facility: CLINIC | Age: 49
End: 2024-04-19
Payer: COMMERCIAL

## 2024-04-19 VITALS
SYSTOLIC BLOOD PRESSURE: 144 MMHG | BODY MASS INDEX: 32.1 KG/M2 | TEMPERATURE: 97.8 F | HEIGHT: 64 IN | DIASTOLIC BLOOD PRESSURE: 88 MMHG | WEIGHT: 188 LBS

## 2024-04-19 DIAGNOSIS — B00.9 HERPES: ICD-10-CM

## 2024-04-19 DIAGNOSIS — I10 PRIMARY HYPERTENSION: ICD-10-CM

## 2024-04-19 DIAGNOSIS — M06.4 INFLAMMATORY POLYARTHRITIS (MULTI): ICD-10-CM

## 2024-04-19 DIAGNOSIS — F32.2 MAJOR DEPRESSIVE DISORDER, SINGLE EPISODE, SEVERE WITHOUT PSYCHOTIC FEATURES (MULTI): Primary | ICD-10-CM

## 2024-04-19 DIAGNOSIS — G95.0 SYRINGOMYELIA (MULTI): ICD-10-CM

## 2024-04-19 PROBLEM — R10.9 ABDOMINAL PAIN: Status: RESOLVED | Noted: 2023-08-29 | Resolved: 2024-04-19

## 2024-04-19 PROBLEM — R11.2 NAUSEA AND VOMITING IN ADULT: Status: RESOLVED | Noted: 2023-08-29 | Resolved: 2024-04-19

## 2024-04-19 PROBLEM — K29.70 GASTRITIS: Status: RESOLVED | Noted: 2023-08-29 | Resolved: 2024-04-19

## 2024-04-19 PROCEDURE — 99214 OFFICE O/P EST MOD 30 MIN: CPT | Performed by: INTERNAL MEDICINE

## 2024-04-19 PROCEDURE — 3008F BODY MASS INDEX DOCD: CPT | Performed by: INTERNAL MEDICINE

## 2024-04-19 PROCEDURE — 3077F SYST BP >= 140 MM HG: CPT | Performed by: INTERNAL MEDICINE

## 2024-04-19 PROCEDURE — 3079F DIAST BP 80-89 MM HG: CPT | Performed by: INTERNAL MEDICINE

## 2024-04-19 RX ORDER — LISINOPRIL 10 MG/1
20 TABLET ORAL
Qty: 30 TABLET | Refills: 5 | Status: SHIPPED | OUTPATIENT
Start: 2024-04-19 | End: 2024-04-19

## 2024-04-19 RX ORDER — ACYCLOVIR 50 MG/G
1 OINTMENT TOPICAL
Qty: 5 G | Refills: 0 | Status: SHIPPED | OUTPATIENT
Start: 2024-04-19 | End: 2024-04-23

## 2024-04-19 RX ORDER — LISINOPRIL 10 MG/1
10 TABLET ORAL
Qty: 30 TABLET | Refills: 5 | Status: SHIPPED | OUTPATIENT
Start: 2024-04-19

## 2024-04-19 ASSESSMENT — ENCOUNTER SYMPTOMS
NERVOUS/ANXIOUS: 0
CHEST TIGHTNESS: 0
DIFFICULTY URINATING: 0
ACTIVITY CHANGE: 0
SORE THROAT: 0
UNEXPECTED WEIGHT CHANGE: 1
NECK PAIN: 0
FREQUENCY: 0
CHILLS: 0
DECREASED CONCENTRATION: 0
ARTHRALGIAS: 1
FLANK PAIN: 0
BACK PAIN: 1
APPETITE CHANGE: 0
SHORTNESS OF BREATH: 0
WEAKNESS: 0
SLEEP DISTURBANCE: 0
DIZZINESS: 0
WHEEZING: 0
LIGHT-HEADEDNESS: 0
HEADACHES: 0
ABDOMINAL PAIN: 0
COUGH: 0
DYSURIA: 0
BLOOD IN STOOL: 0
PALPITATIONS: 0

## 2024-04-19 NOTE — PROGRESS NOTES
"Subjective   Patient ID: Moisés Lambert is a 48 y.o. female who presents for Obesity.    HPI patient is seen today for follow-up.  She is concerned about her weight.  She has gained about 21 pounds since last 6 months.  Her medical history is significant for syringomyelia, chronic weakness, chronic muscle pain, inability to ambulate.  She has hypertension but has not been taking lisinopril.  She has chronic anxiety and depression for which she takes Wellbutrin and escitalopram.  She is sees a psychiatrist on a regular basis.    She also complains of recurrent rash, burning with clear liquid discharge in the pubic area.  She has a history of herpes.  She has used antiviral topical in the past.  Review of Systems   Constitutional:  Positive for unexpected weight change. Negative for activity change, appetite change and chills.        Wt gain 21 lbs   HENT:  Negative for congestion, postnasal drip and sore throat.    Eyes:  Negative for visual disturbance.   Respiratory:  Negative for cough, chest tightness, shortness of breath and wheezing.    Cardiovascular:  Negative for chest pain, palpitations and leg swelling.   Gastrointestinal:  Negative for abdominal pain and blood in stool.   Endocrine: Negative for cold intolerance and heat intolerance.   Genitourinary:  Negative for difficulty urinating, dysuria, flank pain and frequency.   Musculoskeletal:  Positive for arthralgias and back pain. Negative for gait problem and neck pain.   Skin:  Negative for rash.   Allergic/Immunologic: Negative for environmental allergies and food allergies.   Neurological:  Negative for dizziness, weakness, light-headedness and headaches.   Psychiatric/Behavioral:  Negative for decreased concentration and sleep disturbance. The patient is not nervous/anxious.        Objective   /88   Temp 36.6 °C (97.8 °F)   Ht 1.636 m (5' 4.4\")   Wt 85.3 kg (188 lb)   BMI 31.87 kg/m²     Physical Exam  Vitals reviewed.   Constitutional:      "  General: She is not in acute distress.     Appearance: Normal appearance.   HENT:      Head: Normocephalic and atraumatic.      Mouth/Throat:      Mouth: Mucous membranes are moist.   Eyes:      Extraocular Movements: Extraocular movements intact.      Conjunctiva/sclera: Conjunctivae normal.      Pupils: Pupils are equal, round, and reactive to light.   Cardiovascular:      Rate and Rhythm: Normal rate and regular rhythm.      Pulses: Normal pulses.   Pulmonary:      Effort: Pulmonary effort is normal. No respiratory distress.      Breath sounds: Normal breath sounds.   Abdominal:      General: Bowel sounds are normal. There is no distension.      Tenderness: There is no abdominal tenderness.   Musculoskeletal:         General: Tenderness present. No swelling. Normal range of motion.      Cervical back: Normal range of motion.      Comments: Muscle weakness noted   Skin:     General: Skin is warm.   Neurological:      General: No focal deficit present.      Mental Status: She is alert.      Motor: Weakness present.      Coordination: Coordination normal.      Gait: Gait abnormal.   Psychiatric:         Mood and Affect: Mood normal.         Behavior: Behavior normal.         Assessment/Plan   Diagnoses and all orders for this visit:  Major depressive disorder, single episode, severe without psychotic features (Multi)  Comments:  Recommendation increase Wellbutrin  DC Lexapro due to weight gain  Syringomyelia (Multi)  Comments:  Chronic weakness  Refer to water therapy or physical therapy  Inflammatory polyarthritis (Multi)  Comments:  Chronic polyarthritis-plan as above  Orders:  -     Referral to Physical Therapy; Future  Herpes  Comments:  Start topical acyclovir  Follow-up with gynecology  Orders:  -     acyclovir (Zovirax) 5 % ointment; Apply 1 Application topically 5 times a day for 4 days. Space applications every 3 hours.  Primary hypertension  Comments:  Blood pressure is elevated due to weight  gain  Restart lisinopril 10 mg daily  Orders:  -     Comprehensive Metabolic Panel; Future  -     TSH with reflex to Free T4 if abnormal; Future  -     Lipid Panel; Future  -     lisinopril 10 mg tablet; Take 1 tablet (10 mg) by mouth once daily.  BMI 31.0-31.9,adult  Comments:  Eat healthy and monitor weight    Follow-up in 5 to 6 months for physical exam

## 2024-04-22 ENCOUNTER — TELEPHONE (OUTPATIENT)
Dept: ADMISSION | Facility: HOSPITAL | Age: 49
End: 2024-04-22
Payer: COMMERCIAL

## 2024-04-22 NOTE — TELEPHONE ENCOUNTER
Call placed to patient and given lab instructions and appt changes.   No further questions or concerns at this time.  Appt updated in the system.

## 2024-05-10 ENCOUNTER — LAB (OUTPATIENT)
Dept: LAB | Facility: CLINIC | Age: 49
End: 2024-05-10
Payer: MEDICARE

## 2024-05-10 DIAGNOSIS — I10 PRIMARY HYPERTENSION: ICD-10-CM

## 2024-05-10 DIAGNOSIS — D50.9 IRON DEFICIENCY ANEMIA, UNSPECIFIED IRON DEFICIENCY ANEMIA TYPE: Chronic | ICD-10-CM

## 2024-05-10 LAB
ALBUMIN SERPL BCP-MCNC: 4.7 G/DL (ref 3.4–5)
ALP SERPL-CCNC: 58 U/L (ref 33–110)
ALT SERPL W P-5'-P-CCNC: 12 U/L (ref 7–45)
ANION GAP SERPL CALC-SCNC: 11 MMOL/L (ref 10–20)
AST SERPL W P-5'-P-CCNC: 16 U/L (ref 9–39)
BASOPHILS # BLD AUTO: 0.02 X10*3/UL (ref 0–0.1)
BASOPHILS NFR BLD AUTO: 0.4 %
BILIRUB SERPL-MCNC: 0.2 MG/DL (ref 0–1.2)
BUN SERPL-MCNC: 8 MG/DL (ref 6–23)
CALCIUM SERPL-MCNC: 9.4 MG/DL (ref 8.6–10.6)
CHLORIDE SERPL-SCNC: 105 MMOL/L (ref 98–107)
CHOLEST SERPL-MCNC: 222 MG/DL (ref 0–199)
CHOLESTEROL/HDL RATIO: 3.8
CO2 SERPL-SCNC: 25 MMOL/L (ref 21–32)
CREAT SERPL-MCNC: 0.54 MG/DL (ref 0.5–1.05)
EGFRCR SERPLBLD CKD-EPI 2021: >90 ML/MIN/1.73M*2
EOSINOPHIL # BLD AUTO: 0.05 X10*3/UL (ref 0–0.7)
EOSINOPHIL NFR BLD AUTO: 1.1 %
ERYTHROCYTE [DISTWIDTH] IN BLOOD BY AUTOMATED COUNT: 11.6 % (ref 11.5–14.5)
FERRITIN SERPL-MCNC: 178 NG/ML (ref 8–150)
GLUCOSE SERPL-MCNC: 77 MG/DL (ref 74–99)
HCT VFR BLD AUTO: 40.2 % (ref 36–46)
HDLC SERPL-MCNC: 58 MG/DL
HGB BLD-MCNC: 13.1 G/DL (ref 12–16)
IMM GRANULOCYTES # BLD AUTO: 0.02 X10*3/UL (ref 0–0.7)
IMM GRANULOCYTES NFR BLD AUTO: 0.4 % (ref 0–0.9)
IRON SATN MFR SERPL: 18 % (ref 25–45)
IRON SERPL-MCNC: 64 UG/DL (ref 35–150)
LDLC SERPL CALC-MCNC: 110 MG/DL
LYMPHOCYTES # BLD AUTO: 1.51 X10*3/UL (ref 1.2–4.8)
LYMPHOCYTES NFR BLD AUTO: 32.1 %
MCH RBC QN AUTO: 28.4 PG (ref 26–34)
MCHC RBC AUTO-ENTMCNC: 32.6 G/DL (ref 32–36)
MCV RBC AUTO: 87 FL (ref 80–100)
MONOCYTES # BLD AUTO: 0.42 X10*3/UL (ref 0.1–1)
MONOCYTES NFR BLD AUTO: 8.9 %
NEUTROPHILS # BLD AUTO: 2.69 X10*3/UL (ref 1.2–7.7)
NEUTROPHILS NFR BLD AUTO: 57.1 %
NON HDL CHOLESTEROL: 164 MG/DL (ref 0–149)
NRBC BLD-RTO: NORMAL /100{WBCS}
PLATELET # BLD AUTO: 284 X10*3/UL (ref 150–450)
POTASSIUM SERPL-SCNC: 4.4 MMOL/L (ref 3.5–5.3)
PROT SERPL-MCNC: 7.2 G/DL (ref 6.4–8.2)
RBC # BLD AUTO: 4.62 X10*6/UL (ref 4–5.2)
SODIUM SERPL-SCNC: 137 MMOL/L (ref 136–145)
TIBC SERPL-MCNC: 355 UG/DL (ref 240–445)
TRIGL SERPL-MCNC: 271 MG/DL (ref 0–149)
TSH SERPL-ACNC: 1.2 MIU/L (ref 0.44–3.98)
UIBC SERPL-MCNC: 291 UG/DL (ref 110–370)
VIT B12 SERPL-MCNC: 473 PG/ML (ref 211–911)
VLDL: 54 MG/DL (ref 0–40)
WBC # BLD AUTO: 4.7 X10*3/UL (ref 4.4–11.3)

## 2024-05-10 PROCEDURE — 83540 ASSAY OF IRON: CPT | Performed by: NURSE PRACTITIONER

## 2024-05-10 PROCEDURE — 36415 COLL VENOUS BLD VENIPUNCTURE: CPT

## 2024-05-10 PROCEDURE — 82728 ASSAY OF FERRITIN: CPT | Performed by: NURSE PRACTITIONER

## 2024-05-10 PROCEDURE — 82607 VITAMIN B-12: CPT | Performed by: NURSE PRACTITIONER

## 2024-05-10 PROCEDURE — 85025 COMPLETE CBC W/AUTO DIFF WBC: CPT

## 2024-05-10 PROCEDURE — 84443 ASSAY THYROID STIM HORMONE: CPT | Performed by: INTERNAL MEDICINE

## 2024-05-10 PROCEDURE — 80053 COMPREHEN METABOLIC PANEL: CPT | Performed by: INTERNAL MEDICINE

## 2024-05-10 PROCEDURE — 80061 LIPID PANEL: CPT | Performed by: INTERNAL MEDICINE

## 2024-05-16 ENCOUNTER — TELEMEDICINE (OUTPATIENT)
Dept: HEMATOLOGY/ONCOLOGY | Facility: CLINIC | Age: 49
End: 2024-05-16
Payer: COMMERCIAL

## 2024-05-16 DIAGNOSIS — D50.9 IRON DEFICIENCY ANEMIA, UNSPECIFIED IRON DEFICIENCY ANEMIA TYPE: Chronic | ICD-10-CM

## 2024-05-16 PROCEDURE — 3008F BODY MASS INDEX DOCD: CPT | Performed by: NURSE PRACTITIONER

## 2024-05-16 PROCEDURE — 99213 OFFICE O/P EST LOW 20 MIN: CPT | Performed by: NURSE PRACTITIONER

## 2024-05-16 NOTE — PROGRESS NOTES
Patient ID: Moisés Lambert is a 48 y.o. female.  Referring Physician: Bobbi Ortiz, APRN-CNP  [address]  [address]  [place] [address]  Primary Care Provider: No primary care provider on file.  Visit Type:  Follow Up transfer of care from Bobbi Ortiz CNP    Verbal consent was requested and obtained from patient on this date for a telehealth visit.    Subjective    Location: [facility]  Initial consult: 12/15/22 with bobbi Ortiz CNP  Reason: anemia  DX: PJ, b12 def, SC trait. Malabsorption s/p gastric bypass  TX: IV iron, B12 subcutaneous     Verbal consent was requested and obtained from patient on this date for a telehealth visit.     Patient is a 49 yo female with a PMH of PJ,  SC trait, PUD, malabsorption s/p gastric bypass (x3), PCOS  s/p hysterectomy, htn, migraines, depression, lichen planus, syringomyelia and  was referred to benign hematology for consultation of anemia hx receiving IV iron. Referred by Dr. Alcala.     Today, patient presents for followup via phone.      S/p 3 doses IV iron and 1 blood transfusion 3/2023. Had itchy rash with blood transfusion but pt reports she has had issues with itchy rashes (intermittent) for years - was given benadryl and steroids IV  w/improvement. Had itch rashes with IV Venofer - PO benadryl did not improve so was given IV Benadryl 25mg with good relief. Does tell me today that she had significant itching even with 25mg IV benadryl with last IV iron - went home and did not tell anyone - kept taking PO benadryl and eventually resolved.      She is feeling tired again. No abnormal bleeding, no melena. Brusies easily but nothing severe.      Recent hospitalization for complications s/p liposuction 8/2023.     B12 injections monthly at home w/o issue. Never started folic acid.      Recall - Chronic GI issues - since 3rd gastric sx, stools are liquid and yellow or sludge - has bouts of pancreatitis. Also has hemorrhoids but do not bleed much. Has not  followed with GI for many years.  Has had multiple blood transfusions in the past per pt  and has had IV iron in past.     Has had surgery in past w/o issue.      SH: Diet - reg, Tobacco - no , ETOH - no , Rec drug use - no . Radiation exposure - no .  PSH: gastric bypass x3, hysterectomy, gallbladder  FH: hep c, breast ca, ovarian ca.  Daughter with anemia   Meds: see list     Chief Complaint: anemia   Interval History:    Location: [facility]  Initial consult: 12/15/22  Reason: anemia  DX: PJ, b12 def, SC trait. Malabsorption s/p gastric bypass  TX: IV iron, B12 subcutaneous     Verbal consent was requested and obtained from patient on this date for a telehealth visit.     Patient is a 49 yo female with a PMH of PJ,  SC trait, PUD, malabsorption s/p gastric bypass (x3), PCOS  s/p hysterectomy, htn, migraines, depression, lichen planus, syringomyelia and  was referred to benign hematology for consultation of anemia hx receiving IV iron. Referred by Dr. Alcala.     Today, patient presents for followup via phone.      S/p 3 doses IV iron and 1 blood transfusion 3/2023. Had itchy rash with blood transfusion but pt reports she has had issues with itchy rashes (intermittent) for years - was given benadryl and steroids IV  w/improvement. Had itch rashes with IV Venofer - PO benadryl did not improve so was given IV Benadryl 25mg with good relief. Does tell me today that she had significant itching even with 25mg IV benadryl with last IV iron - went home and did not tell anyone - kept taking PO benadryl and eventually resolved.      She is feeling tired again today but attributes this to her chronic pain issues. Recent labs last week no that she no longer has anemia.  No abnormal bleeding, no melena. Brusies easily but nothing severe.      Recent hospitalization for complications s/p liposuction 8/2023.     B12 injections monthly at home w/o issue. Taking folic acid via MVI gummies.      Recall - Chronic GI issues -  since 3rd gastric sx, stools are liquid and yellow or sludge - has bouts of pancreatitis. Also has hemorrhoids but do not bleed much. Has not followed with GI for many years.  Has had multiple blood transfusions in the past per pt  and has had IV iron in past.     Has had surgery in past w/o issue.      SH: Diet - reg, Tobacco - no , ETOH - no , Rec drug use - no . Radiation exposure - no .  PSH: gastric bypass x3, hysterectomy, gallbladder  FH: hep c, breast ca, ovarian ca.  Daughter with anemia   Meds: see list      Review of Systems:      10 point review of systems negative except as state in HPI.      Objective   BSA: There is no height or weight on file to calculate BSA.  There were no vitals taken for this visit.     has a past medical history of Excessive and frequent menstruation with irregular cycle (07/19/2017), Malabsorption of iron ([facility]) (11/15/2023), Personal history of diseases of the blood and blood-forming organs and certain disorders involving the immune mechanism, Personal history of other diseases of the digestive system, Personal history of other diseases of the female genital tract, Personal history of other diseases of the female genital tract (06/30/2017), Personal history of other mental and behavioral disorders (02/13/2017), and Syringomyelia and syringobulbia (Multi) (02/16/2017).   has a past surgical history that includes Stomach surgery (01/20/2016); Other surgical history (01/20/2016); Other surgical history (07/21/2017); Other surgical history (08/02/2022); Tubal ligation (01/30/2017); Cholecystectomy (01/27/2016); Laparoscopy diagnostic / biopsy / aspiration / lysis (04/11/2017); and Gastric bypass (02/15/2016).  Family History   Problem Relation Name Age of Onset    Liver disease Mother      Other (HEPTIC CIRRHOIS) Mother      Other (HEP C CHRONIC) Mother      Emphysema Father      Diabetes Father      Breast cancer Father's Sister      BRCA 1 Father's Sister      Ovarian cancer  Father's Sister       Oncology History    No history exists.       Moisés Lambert  reports that she has never smoked. She has never used smokeless tobacco.  She  reports that she does not currently use alcohol.  She  reports no history of drug use.    WBC   Date/Time Value Ref Range Status   05/10/2024 01:16 PM 4.7 4.4 - 11.3 x10*3/uL Final   11/09/2023 12:15 PM 3.5 (L) 4.4 - 11.3 x10*3/uL Final   06/19/2023 10:42 AM 4.5 4.4 - 11.3 x10E9/L Final   03/02/2023 01:08 PM 5.7 4.4 - 11.3 x10E9/L Final   01/19/2023 02:47 PM 4.9 4.4 - 11.3 x10E9/L Final     nRBC   Date Value Ref Range Status   05/10/2024   Final     Comment:     Not Measured   11/09/2023   Final     Comment:     Not Measured     RBC   Date Value Ref Range Status   05/10/2024 4.62 4.00 - 5.20 x10*6/uL Final   11/09/2023 4.41 4.00 - 5.20 x10*6/uL Final   06/19/2023 4.13 4.00 - 5.20 x10E12/L Final   03/02/2023 4.55 4.00 - 5.20 x10E12/L Final   01/19/2023 4.70 4.00 - 5.20 x10E12/L Final     Hemoglobin   Date Value Ref Range Status   05/10/2024 13.1 12.0 - 16.0 g/dL Final   11/09/2023 12.4 12.0 - 16.0 g/dL Final   06/19/2023 12.2 12.0 - 16.0 g/dL Final   03/02/2023 11.4 (L) 12.0 - 16.0 g/dL Final   01/19/2023 10.1 (L) 12.0 - 16.0 g/dL Final     Hematocrit   Date Value Ref Range Status   05/10/2024 40.2 36.0 - 46.0 % Final   11/09/2023 38.0 36.0 - 46.0 % Final   06/19/2023 36.6 36.0 - 46.0 % Final   03/02/2023 36.4 36.0 - 46.0 % Final   01/19/2023 34.7 (L) 36.0 - 46.0 % Final     MCV   Date/Time Value Ref Range Status   05/10/2024 01:16 PM 87 80 - 100 fL Final   11/09/2023 12:15 PM 86 80 - 100 fL Final   06/19/2023 10:42 AM 89 80 - 100 fL Final   03/02/2023 01:08 PM 80 80 - 100 fL Final   01/19/2023 02:47 PM 74 (L) 80 - 100 fL Final     MCH   Date/Time Value Ref Range Status   05/10/2024 01:16 PM 28.4 26.0 - 34.0 pg Final   11/09/2023 12:15 PM 28.1 26.0 - 34.0 pg Final     MCHC   Date/Time Value Ref Range Status   05/10/2024 01:16 PM 32.6 32.0 - 36.0 g/dL Final  "  11/09/2023 12:15 PM 32.6 32.0 - 36.0 g/dL Final   06/19/2023 10:42 AM 33.3 32.0 - 36.0 g/dL Final   03/02/2023 01:08 PM 31.3 (L) 32.0 - 36.0 g/dL Final   01/19/2023 02:47 PM 29.1 (L) 32.0 - 36.0 g/dL Final     RDW   Date/Time Value Ref Range Status   05/10/2024 01:16 PM 11.6 11.5 - 14.5 % Final   11/09/2023 12:15 PM 13.3 11.5 - 14.5 % Final   06/19/2023 10:42 AM 12.3 11.5 - 14.5 % Final   03/02/2023 01:08 PM 23.5 (H) 11.5 - 14.5 % Final   01/19/2023 02:47 PM 22.8 (H) 11.5 - 14.5 % Final     Platelets   Date/Time Value Ref Range Status   05/10/2024 01:16  150 - 450 x10*3/uL Final   11/09/2023 12:15  150 - 450 x10*3/uL Final   06/19/2023 10:42  150 - 450 x10E9/L Final   03/02/2023 01:08  150 - 450 x10E9/L Final   01/19/2023 02:47  150 - 450 x10E9/L Final     No results found for: \"MPV\"  Neutrophils %   Date/Time Value Ref Range Status   05/10/2024 01:16 PM 57.1 40.0 - 80.0 % Final   11/09/2023 12:15 PM 49.3 40.0 - 80.0 % Final   06/19/2023 10:42 AM 50.1 40.0 - 80.0 % Final   12/19/2022 02:40 PM 67.3 40.0 - 80.0 % Final   08/30/2018 04:33 PM 59.3 40.0 - 80.0 % Final     Immature Granulocytes %, Automated   Date/Time Value Ref Range Status   05/10/2024 01:16 PM 0.4 0.0 - 0.9 % Final     Comment:     Immature Granulocyte Count (IG) includes promyelocytes, myelocytes and metamyelocytes but does not include bands. Percent differential counts (%) should be interpreted in the context of the absolute cell counts (cells/UL).   11/09/2023 12:15 PM 0.3 0.0 - 0.9 % Final     Comment:     Immature Granulocyte Count (IG) includes promyelocytes, myelocytes and metamyelocytes but does not include bands. Percent differential counts (%) should be interpreted in the context of the absolute cell counts (cells/UL).   06/19/2023 10:42 AM 0.2 0.0 - 0.9 % Final     Comment:      Immature Granulocyte Count (IG) includes promyelocytes,    myelocytes and metamyelocytes but does not include bands.   Percent " differential counts (%) should be interpreted in the   context of the absolute cell counts (cells/L).     12/19/2022 02:40 PM 0.0 0.0 - 0.9 % Final     Comment:      Immature Granulocyte Count (IG) includes promyelocytes,    myelocytes and metamyelocytes but does not include bands.   Percent differential counts (%) should be interpreted in the   context of the absolute cell counts (cells/L).     08/30/2018 04:33 PM 0.2 0.0 - 0.9 % Final     Comment:      Percent differential counts (%) should be interpreted in the   context of the absolute cell counts (cells/L).       Lymphocytes %   Date/Time Value Ref Range Status   05/10/2024 01:16 PM 32.1 13.0 - 44.0 % Final   11/09/2023 12:15 PM 39.8 13.0 - 44.0 % Final   06/19/2023 10:42 AM 37.0 13.0 - 44.0 % Final   12/19/2022 02:40 PM 25.0 13.0 - 44.0 % Final   08/30/2018 04:33 PM 30.7 13.0 - 44.0 % Final     Monocytes %   Date/Time Value Ref Range Status   05/10/2024 01:16 PM 8.9 2.0 - 10.0 % Final   11/09/2023 12:15 PM 9.2 2.0 - 10.0 % Final   06/19/2023 10:42 AM 10.0 2.0 - 10.0 % Final   12/19/2022 02:40 PM 6.5 2.0 - 10.0 % Final   08/30/2018 04:33 PM 8.6 2.0 - 10.0 % Final     Eosinophils %   Date/Time Value Ref Range Status   05/10/2024 01:16 PM 1.1 0.0 - 6.0 % Final   11/09/2023 12:15 PM 1.1 0.0 - 6.0 % Final   06/19/2023 10:42 AM 2.0 0.0 - 6.0 % Final   12/19/2022 02:40 PM 0.7 0.0 - 6.0 % Final   08/30/2018 04:33 PM 0.8 0.0 - 6.0 % Final     Basophils %   Date/Time Value Ref Range Status   05/10/2024 01:16 PM 0.4 0.0 - 2.0 % Final   11/09/2023 12:15 PM 0.3 0.0 - 2.0 % Final   06/19/2023 10:42 AM 0.7 0.0 - 2.0 % Final   12/19/2022 02:40 PM 0.5 0.0 - 2.0 % Final   08/30/2018 04:33 PM 0.4 0.0 - 2.0 % Final     Neutrophils Absolute   Date/Time Value Ref Range Status   05/10/2024 01:16 PM 2.69 1.20 - 7.70 x10*3/uL Final     Comment:     Percent differential counts (%) should be interpreted in the context of the absolute cell counts (cells/uL).   11/09/2023 12:15 PM 1.72  1.20 - 7.70 x10*3/uL Final     Comment:     Percent differential counts (%) should be interpreted in the context of the absolute cell counts (cells/uL).   06/19/2023 10:42 AM 2.26 1.20 - 7.70 x10E9/L Final     Comment:      Percent differential counts (%) should be interpreted in the   context of the absolute cell counts (cells/L).     12/19/2022 02:40 PM 3.72 1.20 - 7.70 x10E9/L Final     Comment:      Percent differential counts (%) should be interpreted in the   context of the absolute cell counts (cells/L).     08/30/2018 04:33 PM 2.90 1.20 - 7.70 x10E9/L Final     Immature Granulocytes Absolute, Automated   Date/Time Value Ref Range Status   05/10/2024 01:16 PM 0.02 0.00 - 0.70 x10*3/uL Final   11/09/2023 12:15 PM 0.01 0.00 - 0.70 x10*3/uL Final     Lymphocytes Absolute   Date/Time Value Ref Range Status   05/10/2024 01:16 PM 1.51 1.20 - 4.80 x10*3/uL Final   11/09/2023 12:15 PM 1.39 1.20 - 4.80 x10*3/uL Final   06/19/2023 10:42 AM 1.67 1.20 - 4.80 x10E9/L Final   12/19/2022 02:40 PM 1.38 1.20 - 4.80 x10E9/L Final   08/30/2018 04:33 PM 1.50 1.20 - 4.80 x10E9/L Final     Monocytes Absolute   Date/Time Value Ref Range Status   05/10/2024 01:16 PM 0.42 0.10 - 1.00 x10*3/uL Final   11/09/2023 12:15 PM 0.32 0.10 - 1.00 x10*3/uL Final   06/19/2023 10:42 AM 0.45 0.10 - 1.00 x10E9/L Final   12/19/2022 02:40 PM 0.36 0.10 - 1.00 x10E9/L Final   08/30/2018 04:33 PM 0.42 0.10 - 1.00 x10E9/L Final     Eosinophils Absolute   Date/Time Value Ref Range Status   05/10/2024 01:16 PM 0.05 0.00 - 0.70 x10*3/uL Final   11/09/2023 12:15 PM 0.04 0.00 - 0.70 x10*3/uL Final   06/19/2023 10:42 AM 0.09 0.00 - 0.70 x10E9/L Final   12/19/2022 02:40 PM 0.04 0.00 - 0.70 x10E9/L Final   08/30/2018 04:33 PM 0.04 0.00 - 0.70 x10E9/L Final     Basophils Absolute   Date/Time Value Ref Range Status   05/10/2024 01:16 PM 0.02 0.00 - 0.10 x10*3/uL Final   11/09/2023 12:15 PM 0.01 0.00 - 0.10 x10*3/uL Final   06/19/2023 10:42 AM 0.03 0.00 - 0.10  x10E9/L Final   12/19/2022 02:40 PM 0.03 0.00 - 0.10 x10E9/L Final   08/30/2018 04:33 PM 0.02 0.00 - 0.10 x10E9/L Final       Assessment/Plan      Chief Complaint: anemia   Interval History:    Location: [facility]  Initial consult: 12/15/22  Reason: anemia  DX: PJ, b12 def, SC trait. Malabsorption s/p gastric bypass  TX: IV iron, B12 subcutaneous     Verbal consent was requested and obtained from patient on this date for a telehealth visit.        Patient is a 48 yo female with a PMH of PJ,  SC trait, PUD, malabsorption s/p gastric bypass (x3), PCOS  s/p hysterectomy, htn, migraines, depression, lichen planus, syringomyelia and  was referred to benign hematology for consultation of anemia hx receiving IV iron. Referred by Dr. Alcala.     Today, patient presents for followup via phone.      S/p 3 doses IV iron and 1 blood transfusion 3/2023. Had itchy rash with blood transfusion but pt reports she has had issues with itchy rashes (intermittent) for years - was given benadryl and steroids IV  w/improvement. Had itch rashes with IV Venofer - PO benadryl did not improve so was given IV Benadryl 25mg with good relief. Does tell me today that she had significant itching even with 25mg IV benadryl with last IV iron - went home and did not tell anyone - kept taking PO benadryl and eventually resolved.      She is feeling tired again. No abnormal bleeding, no melena. Brusies easily but nothing severe.      Recent hospitalization for complications s/p liposuction 8/2023.     B12 injections monthly at home w/o issue. Never started folic acid.      Recall - Chronic GI issues - since 3rd gastric sx, stools are liquid and yellow or sludge - has bouts of pancreatitis. Also has hemorrhoids but do not bleed much. Has not followed with GI for many years.  Has had multiple blood transfusions in the past per pt  and has had IV iron in past.     Has had surgery in past w/o issue.      SH: Diet - reg, Tobacco - no , ETOH - no , Rec  "drug use - no . Radiation exposure - no .  PSH: gastric bypass x3, hysterectomy, gallbladder  FH: hep c, breast ca, ovarian ca.  Daughter with anemia   Meds: see list      Review of Systems:   Review of Systems:    10 point review of systems negative except as state in HPI.         Allergies and Intolerances:       Allergies:         Demerol HCl: Drug, Itching, Active     Outpatient Medication Profile:  * Patient Currently Takes Medications as of 02-Mar-2023 12:30 documented in Structured Notes         cyanocobalamin 1000 mcg/mL injectable solution : 1000 microgram(s) subcutaneous once a month. please dispense appropriate number of 3ml syringes with 25g 5/8\" needles , Start Date: 02-Mar-2023         please dispense appropriate number of 3ml syringes with 25g 5/8\" needles : For Vitamin B12 injections, Start Date: 19-Dec-2022         Alcohol Pads (50 each): Use topically prior to testing or injecting Vitamin  B12., Start Date: 19-Dec-2022         cyclobenzaprine 10 mg oral tablet: 1 tab(s) orally once a day, Start Date:  28-Jun-2017         buPROPion 300 mg/24 hours (XL) oral tablet, extended release: 1 tab(s)  orally every 24 hours, Start Date: 26-Jun-2017         lisinopril 20 mg oral tablet: 1 tab(s) orally once a day         Vitamin D2 50 mcg (2000 intl units) oral capsule: 1 cap(s) orally once  a day         escitalopram 20 mg oral tablet: 1 tab(s) orally once a day         eszopiclone 3 mg oral tablet: 1 tab(s) orally once a day (at bedtime)         ferrous gluconate 86 mg oral capsule:              Medical History:         History of robot-assisted laparoscopic hysterectomy: ICD-10:  Z98.89, Status: Active         HTN (hypertension): ICD-10: I10, Status: Active         Iron deficiency anemia: ICD-10: D50.9, Status: Active         PUD (peptic ulcer disease): ICD-10: K27.9, Status: Active         S/P hysterectomy: ICD-10: Z90.710, Status: Active         PCOS (polycystic ovarian syndrome): ICD-10: E28.2, Status:  " Active         Depression: ICD-10: F32.9, Status: Active         Migraine: ICD-10: G43.909, Status: Active         H/O gastric bypass: ICD-10: Z98.890, Status: Active         Endometriosis: ICD-10: N80.9, Status: Active         Social History:   Social Substance History:  ·  Smoking Status never smoker (1)      Physical Exam:      Constitutional: nad   Respiratory/Thorax: breathing easily   Musculoskeletal: ROM intact   Neurological: aox3   Psychological: Pleasant, appropriate, and easily  engaged         Lab Results:     ·  Results        CBC date/time       WBC     HGB     HCT     PLT     Neut      02-Mar-2023 13:08   5.7     11.4(L) 36.4    210     N/A  19-Jan-2023 14:47   4.9     10.1(L) 34.7(L) 302     N/A  12-Jan-2023 10:14   4.5     6.9(L)  24.2(L) 305     N/A  19-Dec-2022 14:40   5.5     7.6(L)  27.3(L) 449     3.72  26-Aug-2022 11:51   4.7     8.0(L)  28.5(L) 422     N/A  30-Aug-2018 16:33   4.9     11.2(L) 34.7(L) 272     2.90  16-Dec-2017 04:47   3.0(L)  9.9(L)  31.8(L) 308     1.00(L)     BMP date/time       NA              K               CL              CO2             BUN             CREAT             26-Aug-2022 11:51   138             4.8             105             N/A             10              0.66  30-Aug-2018 16:33   137             4.0             107             N/A             10              0.75  16-Dec-2017 04:47   140             3.8             108(H)          N/A             8               0.63  15-Dec-2017 16:25   140             3.6             107             N/A             9               0.67  11-Dec-2017 21:15   137             3.2(L)          104             N/A             7               0.62  23-Jul-2017 06:40   139             3.3(L)          106             N/A             6               0.56  22-Jul-2017 10:16   138             3.6             106             N/A             6               0.67     Hepatic date/time   T Pro   T Bili  AST     ALT     ALKP    ALB        22-Jun-2017 05:50   6.8     0.3     30      58(H)   95      3.9  11-Apr-2016 18:32   7.7     0.4     54(H)   18      64      4.0     LDH date/time       LDH     19-Dec-2022 14:41   193  16-Feb-2017 10:26   N/A        I have reviewed these laboratory results:     Complete Blood Count  Trending View       Result 02-Mar-2023 13:08:00  19-Jan-2023 14:47:00  12-Jan-2023 10:14:00    White Blood Cell Count 5.7   4.9   4.5    Red Blood Cell Count 4.55   4.70   3.54   L    HGB 11.4   L   10.1   L   6.9   L    HCT 36.4   34.7   L   24.2   L    MCV 80   74   L   68   L    MCHC 31.3   L   29.1   L   28.5   L       302   305    RDW-CV 23.5   H   22.8   H   17.0   H        Iron + TIBC, Serum  02-Mar-2023 13:08:00       Result Value    Iron, Serum  88    Total Iron Binding Capacity  355    % Saturation  25       Ferritin, Serum  02-Mar-2023 13:08:00       Result Value    Ferritin, Serum  98          Assessment and Plan:      Patient is a 49 yo female with a PMH of PJ, PUD,  malabsorption s/p gastric bypass, PCOS s/p hysterectomy, htn, migraines,  depression, lichen planus, syringomyelia and was referred to benign hematology for consultation of anemia - hx receiving IV iron. Referred by  Dr. Alcala.     Today, patient presents for initial followup virtually.      I reviewed patient's chart including but not limited to labs, imaging, surgical/procedure notes, pathology, hospital notes, doctor's notes. Has had chronic mild to moderate anemia essentially since 2008. Microcytic to normocytic anemia. Known PJ, b12  deficiency, and malabsorption s/p gastric bypass. HX AUB but none since hysterectomy. Very mild hemorrhoid bleeding at times, no other abnormal bruising or bleeding. Taking oral iron but increases GI distress, pain, constipation and reports has trialed  in the past multiple times w/o any change in anemia/iron studies.     Discussed will likely require lifelong IV iron d/t malabsorption s/p gastric bypass. Will  likely also require B12 and folate supplementation lifelong.       PLAN:  1. FUV  6mo with labs 1 week before appt (cbcd, iron studies, b12, folate). Labs in EMR. Will discuss lab results/recommendations at next appt unless something needs immediate follow-up   2. Continue Vit B12 1mg SQ q4wks. RX sent to pharmacy.   3. IV Venofer prn. Premed with Benadryl.  4. Discussed referral to GI but pt declined  5. F/U with PCP about rashes    I had an extensive discussion with the patient regarding the diagnosis and discussed the plan of therapy, including general considerations regarding side effects and outcomes. Pt understood and gave appropriate teach back about the plan of care. All questions  were answered to the patient's satisfaction. The patient is instructed to contact us at any time if questions or problems arise. Thank you for the opportunity to participate in the care of this very pleasant patient.     Rosanne M Casal, APRN-CNP, DNP

## 2024-07-15 ENCOUNTER — OFFICE VISIT (OUTPATIENT)
Dept: NEUROLOGY | Facility: CLINIC | Age: 49
End: 2024-07-15
Payer: MEDICARE

## 2024-07-15 VITALS
DIASTOLIC BLOOD PRESSURE: 85 MMHG | BODY MASS INDEX: 32.87 KG/M2 | HEART RATE: 92 BPM | WEIGHT: 197.31 LBS | HEIGHT: 65 IN | SYSTOLIC BLOOD PRESSURE: 138 MMHG | RESPIRATION RATE: 16 BRPM

## 2024-07-15 DIAGNOSIS — G56.22 ULNAR NEUROPATHY AT ELBOW, LEFT: Primary | ICD-10-CM

## 2024-07-15 PROCEDURE — 3079F DIAST BP 80-89 MM HG: CPT | Performed by: STUDENT IN AN ORGANIZED HEALTH CARE EDUCATION/TRAINING PROGRAM

## 2024-07-15 PROCEDURE — 3075F SYST BP GE 130 - 139MM HG: CPT | Performed by: STUDENT IN AN ORGANIZED HEALTH CARE EDUCATION/TRAINING PROGRAM

## 2024-07-15 PROCEDURE — 3008F BODY MASS INDEX DOCD: CPT | Performed by: STUDENT IN AN ORGANIZED HEALTH CARE EDUCATION/TRAINING PROGRAM

## 2024-07-15 PROCEDURE — 99204 OFFICE O/P NEW MOD 45 MIN: CPT | Performed by: STUDENT IN AN ORGANIZED HEALTH CARE EDUCATION/TRAINING PROGRAM

## 2024-07-15 PROCEDURE — 99214 OFFICE O/P EST MOD 30 MIN: CPT | Mod: GC | Performed by: STUDENT IN AN ORGANIZED HEALTH CARE EDUCATION/TRAINING PROGRAM

## 2024-07-15 ASSESSMENT — PAIN SCALES - GENERAL: PAINLEVEL: 6

## 2024-07-15 NOTE — PROGRESS NOTES
Neuromuscular Medicine Consult     Moisés Lambert, MRN: [id number], : 1975  Reason for Referral: Hand weakness and sensory disturbance    Primary Care Physician: Lito Cazares MD     Impression/Plan:   Impression:  Moisés Lambert is a 48 year old with history of gastric bypass surgery, anemia, PCOS, and chronic pain who presents to for evaluation of left hand/forearm numbness and paresthesias. Her neurological exam is confounded by functional overlay during motor testing diffusely though with sensory loss over the medial aspect of left hand and a positive Tinel sign over the left elbow. Overall, her exam localizes best to the left ulnar nerve with suspected compression at the elbow. There was reported sensory symptoms over the left medial forearm however difficult to determine how much of that is more referred pain/increased sensitivity vs objective sensory loss (as that would indicate involvement of the medial antebrachial cutaneous nerve which would have a different differential). Given overall high suspicion for left ulnar neuropathy at the elbow, recommend conservative management and further evaluation with EMG/NCS.    Suspect her more widespread and chronic pain, stiffness, and swelling is not neurological and recommended continued follow-up with PCP and rheumatology (appointment scheduled later this month)    Plan:  -Discussed conservative management of ulnar neuropathy at the elbow (avoiding prolonged pressure at the elbow, ensuring elbow is straight while sleeping)  -Obtain EMG/NCS at [facility]    Edgar Hill MD  Neuromuscular Fellow     I saw the patient and agree with the fellow's findings as outlined above.  Her's constellation of symptoms are most compatible with an ulnar neuropathy at the elbow.  There is weakness in other distributions, however, this seemed to be due to poor effort as a result of her diffuse pain and joint stiffness.  She has an upcoming rheumatological evaluation.   We will see her back for electrodiagnostic testing and have further recommendations for her at that time.    Shahid Michelle D.O.  Neuromuscular Medicine    History of Present Illness:    Ms. Lambert is a 48 y.o. right handed female who presents for evaluation of left wrist and forearm numbness and paresthesias.    Patient notes that she first started having symptoms in August 2023. She ended up waking up one night and she had numbness, tingling, and pain involving the 4th and 5th digits on the left that would radiate up towards the elbow. She does not remember any clear inciting event although she is not sure if she fell in the days prior to symptom onset. At first she was not sure if it was due to her syringomyelia or something else. The symptoms have persisted since August without any clear improvement or progression. She notes that certain movements such as flexion of her elbow can make her symptoms worse. Touching her medial elbow makes it worse. Does not note any clear things that improve her symptoms outside of hold her arm pressed against her torso. Has tried ibuprofen and tylenol which have not helped. Has taken gabapentin in the past but has caused suicidal ideation (denies currently). She struggles with longstanding chronic pain and stiffness though does note that she has dropped a coffee cup with her left hand (though noting that she is not sure if it is due to this issue or her musculoskeletal issues).    Of note, patient has a history of chronic pain affecting her neck, bilateral shoulders, hips, ankles, and wrists with subjective swelling of her extremities. This has been going on for years and feels different than the current issue. 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    Relevant past medical, surgical, family, and social histories, along with ROS was reviewed and pertinent details noted above.     Past Medical History:   Diagnosis Date    Excessive and frequent menstruation with irregular  "cycle 07/19/2017    Menorrhagia with irregular cycle    Malabsorption of iron (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    Syringomyelia     Past Surgical History:   Procedure Laterality Date    CHOLECYSTECTOMY  01/27/2016    Cholecystectomy    GASTRIC BYPASS  02/15/2016    Gastric Surgery For Morbid Obesity Bypass With Hilda-en-Y    LAPAROSCOPY DIAGNOSTIC / BIOPSY / ASPIRATION / LYSIS  04/11/2017    Exploratory Laparoscopy    OTHER SURGICAL HISTORY  01/20/2016    Anastomosis Of Gallbladder    OTHER SURGICAL HISTORY  07/21/2017    Laparoscopy With Total Hysterectomy    OTHER SURGICAL HISTORY  08/02/2022    Hysterectomy    STOMACH SURGERY  01/20/2016    Gastric Surgery    TUBAL LIGATION  01/30/2017    Tubal Ligation     Social history:  Lives in [place], [place]  Does not work, on disability  Never smoker  Occasional alcohol use during celebrations (\"a sip of red wine\")  Occasional marijuana use    Family history:  Not well known though no known family history of neurological disorders     Allergies   Allergen Reactions    Meperidine Itching    Risperidone Nausea Only and Other     AND VOMITING        Medications:    Current Outpatient Medications:     azelastine (Astelin) 137 mcg (0.1 %) nasal spray, Administer 2 sprays into each nostril once daily. Use in each nostril as directed, Disp: , Rfl:     BD Luer-Jackson Syringe 3 mL 25 x 5/8\" syringe, USE TO " "INJCT CYANOCOBALAMIN EVERY 2 WKS, Disp: , Rfl:     buPROPion XL (Wellbutrin XL) 150 mg 24 hr tablet, Take 1 tablet (150 mg) by mouth once daily., Disp: , Rfl:     chlorhexidine (Peridex) 0.12 % solution, USE 15 ML (1/2 OUNCE) IN THE MOUTH OR THROAT IF NEEDED FOR WOUND CARE AS DIRECTED, Disp: 473 mL, Rfl: 0    cyanocobalamin (Vitamin B-12) 1,000 mcg/mL injection, Inject 1 mL (1,000 mcg) under the skin every 30 (thirty) days., Disp: 3 mL, Rfl: 3    cyproheptadine (Periactin) 4 mg tablet, Take by mouth. TAKE 1/2 TAB TWICE DAILY PER DIRECTED, Disp: , Rfl:     escitalopram (Lexapro) 20 mg tablet, Take 1 tablet (20 mg) by mouth once daily., Disp: , Rfl:     eszopiclone (Lunesta) 2 mg tablet, Take 1 tablet (2 mg) by mouth once daily at bedtime. Take immediately before bedtime, Disp: , Rfl:     ferrous gluconate (Fergon) 324 (38 Fe) mg tablet, Take 1 tablet (38 mg of iron) by mouth once daily with a meal., Disp: , Rfl:     fluticasone (Flonase) 50 mcg/actuation nasal spray, Administer 1 spray into each nostril once daily. Shake gently. Before first use, prime pump. After use, clean tip and replace cap., Disp: , Rfl:     lisinopril 10 mg tablet, Take 1 tablet (10 mg) by mouth once daily., Disp: 30 tablet, Rfl: 5    LORazepam (Ativan) 0.5 mg tablet, Take 1 tablet (0.5 mg) by mouth every 6 hours if needed., Disp: , Rfl:     polyethylene glycol-electrolytes 420 gram solution, 4,000 mL 1 time. PER DIRECTED BY  ENDOSCOPY INSTRUCTION, Disp: , Rfl:     syringe with needle, safety 3 mL 25 gauge x 5/8\" syringe, Once a month for Vitamin B12 injections, Disp: 3 each, Rfl: 3    zolpidem (Ambien) 5 mg tablet, Take 1 tablet (5 mg) by mouth once daily at bedtime., Disp: , Rfl:        Physical Exam:   There were no vitals taken for this visit.     General Appearance:  No distress, alert, interactive and cooperative   Skin: No rash present on limbs or extensor surfaces  Neurological:  Mental status: the patient provided an accurate " history, language was normal   Cranial Nerves:  CN 2   Visual fields full to confrontation   CN 3, 4, 6   Pupils equal and round  Lids symmetric; no ptosis  EOMs fully intact  No nystagmus   CN 5   Facial sensation intact bilaterally   CN 7   Normal and symmetric facial strength; nasolabial folds symmetric   CN 8   Hearing intact to conversation    CN 9, 10   Palate elevates symmetrically   Phonation within normal limits, no dysarthria  CN 11   Shoulder shrug at least 4/5 bilaterally though with giveway component due to pain  CN 12   Tongue midline with normal bulk    Motor:   Muscle bulk: Normal throughout, specifically normal bulk in bilateral FDI and ADM muscles  Movements: No tremors, or other abnormal movement    Manual Muscle Testing (MMT) reveals the following MRC grades:  R L   Shoulder abduction  4* 4*  Elbow flexion   4* 4*  Elbow extension  5 5  Finger flexion at DIP (2,3) 4* 4*  Finger flexion at DIP (4,5) 4* 4*  Finger abduction  4* 4*  Thumb abduction   3* 3*  Hip flexion   3* 3*  Knee flexion   4* 4*  Knee extension  3* 3*  Ankle dorsiflexion  4* 4*  Ankle plantarflexion  4* 4*    *Diffuse giveway weakness throughout though with ambulation and movement strength appears appropriate in bilateral lower extremities    Positive Tinel sign over left medial elbow  Inconclusive froment sign on the left (weak though with no compensatory thumb IP flexion)  Negative wartenberg sign bilaterally    Reflexes:     R          L  BR:               2          2  Biceps:         2          2  Knee:           2          2    Sensory:   Decreased sensation to pinprick and light touch over ventral and dorsal aspects of medial hand/palm (digits 4/5)  At times reports decreased sensation to pinprick and light touch over medial forearm though later described increased sensitivity     Coordination:    In both upper extremities, finger-nose-finger was intact without dysmetria or overshoot   Lower extremities limited by  "pain    Gait:   Narrow and slow antalgic appearing gait though with appropriate leg clearance bilaterally       Results:     Labs:  B12 - 473    Imaging:  MRI cervical spine w/o 5/30/19:  IMPRESSION:  Compared to the MRI cervical spine from 2017, there are stable  multilevel degenerative changes including mild spinal canal stenosis  at C4-C5 and moderate spinal canal stenosis at C5-C6.    MRI thoracic spine w/o 7/19/19:  IMPRESSION:  No significant change in the hydromyelia when compared to the prior  exam dated 04/03/2017, however, images are markedly degraded due to  artifacts.    MRI lumbar spine w/o 5/30/19:  IMPRESSION:  There is interval increase in disc desiccation at L5-S1 and there are  now chronic degenerative endplate changes at L5-S1, otherwise  essentially stable MRI of the lumbar spine.    EMG results:  From Dr. Chung's note on 5/21/21:  \"saw Dr. John Santiago for EMG and nerve conduction studies on 10/2/2008, a normal study of the left upper and lower extremity without evidence of peripheral polyneuropathy or left lumbosacral radiculopathy\"  Unable to view EMG  "

## 2024-07-15 NOTE — PATIENT INSTRUCTIONS
You were seen in the Neuromuscular Clinic.    Like we discussed, your symptoms seem to be do to a problem with the ulnar nerve. This can become inflamed at areas like the elbow. In order to confirm it, we would like to do an EMG (nerve test).    For treatment, try and make sure you don't put prolonged pressure on your elbow. When you're sleeping, you can use a bath towel and make a long cylinder. Then when you are sleeping, put your arm in the cylinder to make sure that your arms stay straight when you are sleeping.

## 2024-07-22 NOTE — PROGRESS NOTES
"Subjective   Patient ID: Moisés Lambert is a 48 y.o. female who presents for Joint Pain (Re-established care. Previous patient, here to re-establish care. Patient complains of Joint pain. ).    HPI  Last seen 18 mos ago   · Generalized pain (780.96) (R52)   · Lichen planus, unspecified (697.0) (L43.9)    Chronic pain for over 20 yrs  Last seen virtual visit 2023  Rashes   Joint pain  Sees Neuro Dr. Chung and other neuro for ulnar issues  Sees heme for iron issues anemia IV iron     All joints painful   Hips shoulders  Ulnar nerve issues     Many years many meds   See my prior notes     Hurt so bad took edibles helped but stomach issues  Smoking weed helps  her pain      ROS      Current Outpatient Medications   Medication Sig Dispense Refill    azelastine (Astelin) 137 mcg (0.1 %) nasal spray Administer 2 sprays into each nostril once daily. Use in each nostril as directed      BD Luer-Jackson Syringe 3 mL 25 x 5/8\" syringe USE TO INJCT CYANOCOBALAMIN EVERY 2 WKS      buPROPion XL (Wellbutrin XL) 150 mg 24 hr tablet Take 1 tablet (150 mg) by mouth once daily.      chlorhexidine (Peridex) 0.12 % solution USE 15 ML (1/2 OUNCE) IN THE MOUTH OR THROAT IF NEEDED FOR WOUND CARE AS DIRECTED 473 mL 0    cyanocobalamin (Vitamin B-12) 1,000 mcg/mL injection Inject 1 mL (1,000 mcg) under the skin every 30 (thirty) days. 3 mL 3    cyproheptadine (Periactin) 4 mg tablet Take by mouth. TAKE 1/2 TAB TWICE DAILY PER DIRECTED      escitalopram (Lexapro) 20 mg tablet Take 1 tablet (20 mg) by mouth once daily.      eszopiclone (Lunesta) 2 mg tablet Take 1 tablet (2 mg) by mouth once daily at bedtime. Take immediately before bedtime      ferrous gluconate (Fergon) 324 (38 Fe) mg tablet Take 1 tablet (38 mg of iron) by mouth once daily with breakfast.      fluticasone (Flonase) 50 mcg/actuation nasal spray Administer 1 spray into each nostril once daily. Shake gently. Before first use, prime pump. After use, clean tip and replace cap.   " "   lisinopril 10 mg tablet Take 1 tablet (10 mg) by mouth once daily. 30 tablet 5    LORazepam (Ativan) 0.5 mg tablet Take 1 tablet (0.5 mg) by mouth every 6 hours if needed.      polyethylene glycol-electrolytes 420 gram solution 4,000 mL 1 time. PER DIRECTED BY  ENDOSCOPY INSTRUCTION      syringe with needle, safety 3 mL 25 gauge x 5/8\" syringe Once a month for Vitamin B12 injections 3 each 3    zolpidem (Ambien) 5 mg tablet Take 1 tablet (5 mg) by mouth once daily at bedtime.       No current facility-administered medications for this visit.         has a past medical history of Excessive and frequent menstruation with irregular cycle (07/19/2017), Malabsorption of iron ([facility]) (11/15/2023), Personal history of diseases of the blood and blood-forming organs and certain disorders involving the immune mechanism, Personal history of other diseases of the digestive system, Personal history of other diseases of the female genital tract, Personal history of other diseases of the female genital tract (06/30/2017), Personal history of other mental and behavioral disorders (02/13/2017), and Syringomyelia and syringobulbia (Multi) (02/16/2017).   Past Surgical History:   Procedure Laterality Date    CHOLECYSTECTOMY  01/27/2016    Cholecystectomy    GASTRIC BYPASS  02/15/2016    Gastric Surgery For Morbid Obesity Bypass With Hilda-en-Y    LAPAROSCOPY DIAGNOSTIC / BIOPSY / ASPIRATION / LYSIS  04/11/2017    Exploratory Laparoscopy    OTHER SURGICAL HISTORY  01/20/2016    Anastomosis Of Gallbladder    OTHER SURGICAL HISTORY  07/21/2017    Laparoscopy With Total Hysterectomy    OTHER SURGICAL HISTORY  08/02/2022    Hysterectomy    STOMACH SURGERY  01/20/2016    Gastric Surgery    TUBAL LIGATION  01/30/2017    Tubal Ligation      Social History     Tobacco Use    Smoking status: Never    Smokeless tobacco: Never   Substance Use Topics    Alcohol use: Not Currently    Drug use: Never      family history includes BRCA 1 in her " father's sister; Breast cancer in her father's sister; Diabetes in her father; Emphysema in her father; HEP C CHRONIC in her mother; HEPTIC CIRRHOIS in her mother; Liver disease in her mother; Ovarian cancer in her father's sister.  Pain Management Panel           No data to display                 Vitals:    07/24/24 0958   BP: 127/89   Pulse: 101   SpO2: 98%     Lab Results   Component Value Date    RF <10 06/18/2019    SEDRATE 4 06/18/2019    CRP 0.11 05/17/2019    TSH 1.20 05/10/2024       PHYSICAL EXAM      NODES   HEART  LUNGS  ABDOMEN   VASCULAR  NEURO   SKIN  JOINTS severe limitation of motion of both shoulders with severe pain.  No evidence of active synovitis of any large or small joint of the upper or lower extremity    PLAN  Ms. Lambert was last seen in 2022 at that time she had diffuse chronic pain and was seen by multiple specialist who prescribed multiple medications without results.    Now she has issues with her left ulnar nerve and is being followed by neurology with an upcoming EMG    She presents today with continuing chronic pain in multiple joints of the upper or lower extremity especially both shoulders    Today we will start with an x-ray of both shoulders.  She has seen pain management in the past I believe outside the  system but she is not interested in anything other than taking medical marijuana and this referral was given to her to see Dr. Luis Iyer, phone but for medical marijuana

## 2024-07-24 ENCOUNTER — APPOINTMENT (OUTPATIENT)
Dept: RHEUMATOLOGY | Facility: CLINIC | Age: 49
End: 2024-07-24
Payer: COMMERCIAL

## 2024-07-24 ENCOUNTER — HOSPITAL ENCOUNTER (OUTPATIENT)
Dept: RADIOLOGY | Facility: CLINIC | Age: 49
Discharge: HOME | End: 2024-07-24
Payer: MEDICARE

## 2024-07-24 VITALS
WEIGHT: 191 LBS | SYSTOLIC BLOOD PRESSURE: 127 MMHG | DIASTOLIC BLOOD PRESSURE: 89 MMHG | HEART RATE: 101 BPM | BODY MASS INDEX: 32.28 KG/M2 | OXYGEN SATURATION: 98 %

## 2024-07-24 DIAGNOSIS — L43.9 LICHEN PLANUS, UNSPECIFIED: Primary | ICD-10-CM

## 2024-07-24 DIAGNOSIS — G89.29 OTHER CHRONIC PAIN: ICD-10-CM

## 2024-07-24 DIAGNOSIS — M25.511 CHRONIC PAIN OF BOTH SHOULDERS: ICD-10-CM

## 2024-07-24 DIAGNOSIS — M25.512 CHRONIC PAIN OF BOTH SHOULDERS: ICD-10-CM

## 2024-07-24 DIAGNOSIS — G89.29 CHRONIC PAIN OF BOTH SHOULDERS: ICD-10-CM

## 2024-07-24 PROCEDURE — 3079F DIAST BP 80-89 MM HG: CPT | Performed by: INTERNAL MEDICINE

## 2024-07-24 PROCEDURE — 73030 X-RAY EXAM OF SHOULDER: CPT | Mod: 50

## 2024-07-24 PROCEDURE — 99214 OFFICE O/P EST MOD 30 MIN: CPT | Performed by: INTERNAL MEDICINE

## 2024-07-24 PROCEDURE — 3074F SYST BP LT 130 MM HG: CPT | Performed by: INTERNAL MEDICINE

## 2024-07-24 PROCEDURE — 1036F TOBACCO NON-USER: CPT | Performed by: INTERNAL MEDICINE

## 2024-08-06 DIAGNOSIS — I10 PRIMARY HYPERTENSION: ICD-10-CM

## 2024-08-06 RX ORDER — LISINOPRIL 10 MG/1
10 TABLET ORAL
Qty: 90 TABLET | Refills: 2 | Status: SHIPPED | OUTPATIENT
Start: 2024-08-06

## 2024-08-10 DIAGNOSIS — J02.9 SORE THROAT: ICD-10-CM

## 2024-08-13 RX ORDER — CHLORHEXIDINE GLUCONATE ORAL RINSE 1.2 MG/ML
SOLUTION DENTAL
Qty: 473 ML | Refills: 0 | Status: SHIPPED | OUTPATIENT
Start: 2024-08-13

## 2024-08-20 ENCOUNTER — HOSPITAL ENCOUNTER (OUTPATIENT)
Dept: NEUROLOGY | Facility: CLINIC | Age: 49
Discharge: HOME | End: 2024-08-20
Payer: MEDICARE

## 2024-08-20 DIAGNOSIS — G56.22 ULNAR NEUROPATHY AT ELBOW, LEFT: ICD-10-CM

## 2024-08-20 PROCEDURE — 95885 MUSC TST DONE W/NERV TST LIM: CPT | Performed by: STUDENT IN AN ORGANIZED HEALTH CARE EDUCATION/TRAINING PROGRAM

## 2024-08-20 PROCEDURE — 95911 NRV CNDJ TEST 9-10 STUDIES: CPT | Mod: GC | Performed by: STUDENT IN AN ORGANIZED HEALTH CARE EDUCATION/TRAINING PROGRAM

## 2024-08-20 PROCEDURE — 95911 NRV CNDJ TEST 9-10 STUDIES: CPT | Performed by: STUDENT IN AN ORGANIZED HEALTH CARE EDUCATION/TRAINING PROGRAM

## 2024-11-20 DIAGNOSIS — D50.9 IRON DEFICIENCY ANEMIA, UNSPECIFIED IRON DEFICIENCY ANEMIA TYPE: Chronic | ICD-10-CM

## 2024-11-20 DIAGNOSIS — E53.8 VITAMIN B12 DEFICIENCY: ICD-10-CM

## 2024-11-21 ENCOUNTER — LAB (OUTPATIENT)
Dept: LAB | Facility: CLINIC | Age: 49
End: 2024-11-21
Payer: COMMERCIAL

## 2024-11-21 ENCOUNTER — TELEPHONE (OUTPATIENT)
Dept: HEMATOLOGY/ONCOLOGY | Facility: CLINIC | Age: 49
End: 2024-11-21
Payer: COMMERCIAL

## 2024-11-21 ENCOUNTER — TELEMEDICINE (OUTPATIENT)
Dept: HEMATOLOGY/ONCOLOGY | Facility: CLINIC | Age: 49
End: 2024-11-21
Payer: COMMERCIAL

## 2024-11-21 DIAGNOSIS — E53.8 VITAMIN B12 DEFICIENCY: ICD-10-CM

## 2024-11-21 DIAGNOSIS — K90.9 MALABSORPTION OF IRON (HHS-HCC): Primary | Chronic | ICD-10-CM

## 2024-11-21 DIAGNOSIS — D50.9 IRON DEFICIENCY ANEMIA, UNSPECIFIED IRON DEFICIENCY ANEMIA TYPE: Chronic | ICD-10-CM

## 2024-11-21 LAB
ALBUMIN SERPL BCP-MCNC: 4.5 G/DL (ref 3.4–5)
ALP SERPL-CCNC: 56 U/L (ref 33–110)
ALT SERPL W P-5'-P-CCNC: 7 U/L (ref 7–45)
ANION GAP SERPL CALC-SCNC: 13 MMOL/L (ref 10–20)
AST SERPL W P-5'-P-CCNC: 15 U/L (ref 9–39)
BASOPHILS # BLD AUTO: 0.02 X10*3/UL (ref 0–0.1)
BASOPHILS NFR BLD AUTO: 0.4 %
BILIRUB SERPL-MCNC: 0.3 MG/DL (ref 0–1.2)
BUN SERPL-MCNC: 7 MG/DL (ref 6–23)
CALCIUM SERPL-MCNC: 9.1 MG/DL (ref 8.6–10.6)
CHLORIDE SERPL-SCNC: 107 MMOL/L (ref 98–107)
CO2 SERPL-SCNC: 25 MMOL/L (ref 21–32)
CREAT SERPL-MCNC: 0.6 MG/DL (ref 0.5–1.05)
EGFRCR SERPLBLD CKD-EPI 2021: >90 ML/MIN/1.73M*2
EOSINOPHIL # BLD AUTO: 0.03 X10*3/UL (ref 0–0.7)
EOSINOPHIL NFR BLD AUTO: 0.7 %
ERYTHROCYTE [DISTWIDTH] IN BLOOD BY AUTOMATED COUNT: 12.1 % (ref 11.5–14.5)
FERRITIN SERPL-MCNC: 121 NG/ML (ref 8–150)
FOLATE SERPL-MCNC: 11.1 NG/ML
GLUCOSE SERPL-MCNC: 89 MG/DL (ref 74–99)
HCT VFR BLD AUTO: 36 % (ref 36–46)
HGB BLD-MCNC: 12.2 G/DL (ref 12–16)
IMM GRANULOCYTES # BLD AUTO: 0.01 X10*3/UL (ref 0–0.7)
IMM GRANULOCYTES NFR BLD AUTO: 0.2 % (ref 0–0.9)
IRON SATN MFR SERPL: 14 % (ref 25–45)
IRON SERPL-MCNC: 44 UG/DL (ref 35–150)
LYMPHOCYTES # BLD AUTO: 1.37 X10*3/UL (ref 1.2–4.8)
LYMPHOCYTES NFR BLD AUTO: 29.8 %
MCH RBC QN AUTO: 28.7 PG (ref 26–34)
MCHC RBC AUTO-ENTMCNC: 33.9 G/DL (ref 32–36)
MCV RBC AUTO: 85 FL (ref 80–100)
MONOCYTES # BLD AUTO: 0.38 X10*3/UL (ref 0.1–1)
MONOCYTES NFR BLD AUTO: 8.3 %
NEUTROPHILS # BLD AUTO: 2.79 X10*3/UL (ref 1.2–7.7)
NEUTROPHILS NFR BLD AUTO: 60.6 %
NRBC BLD-RTO: NORMAL /100{WBCS}
PLATELET # BLD AUTO: 254 X10*3/UL (ref 150–450)
POTASSIUM SERPL-SCNC: 4 MMOL/L (ref 3.5–5.3)
PROT SERPL-MCNC: 6.5 G/DL (ref 6.4–8.2)
RBC # BLD AUTO: 4.25 X10*6/UL (ref 4–5.2)
SODIUM SERPL-SCNC: 141 MMOL/L (ref 136–145)
TIBC SERPL-MCNC: 316 UG/DL (ref 240–445)
UIBC SERPL-MCNC: 272 UG/DL (ref 110–370)
VIT B12 SERPL-MCNC: 421 PG/ML (ref 211–911)
WBC # BLD AUTO: 4.6 X10*3/UL (ref 4.4–11.3)

## 2024-11-21 PROCEDURE — 82746 ASSAY OF FOLIC ACID SERUM: CPT

## 2024-11-21 PROCEDURE — 85025 COMPLETE CBC W/AUTO DIFF WBC: CPT

## 2024-11-21 PROCEDURE — 84155 ASSAY OF PROTEIN SERUM: CPT

## 2024-11-21 PROCEDURE — 99213 OFFICE O/P EST LOW 20 MIN: CPT | Performed by: NURSE PRACTITIONER

## 2024-11-21 PROCEDURE — 83540 ASSAY OF IRON: CPT

## 2024-11-21 PROCEDURE — 82728 ASSAY OF FERRITIN: CPT

## 2024-11-21 PROCEDURE — 36415 COLL VENOUS BLD VENIPUNCTURE: CPT

## 2024-11-21 PROCEDURE — 82607 VITAMIN B-12: CPT

## 2024-11-21 NOTE — PROGRESS NOTES
Patient ID: Moisés Lambert is a 49 y.o. female.  Referring Physician: No referring provider defined for this encounter.  Primary Care Provider: Lito Cazares MD  Visit Type:  Follow Up     Verbal consent was requested and obtained from patient on this date for a telehealth visit.    Subjective    Location: [facility]  Initial consult: 12/15/22 with bobbi Ortiz CNP  Reason: anemia  DX: PJ, b12 def, SC trait. Malabsorption s/p gastric bypass  TX: IV iron, B12 subcutaneous     Verbal consent was requested and obtained from patient on this date for a telehealth visit.     Patient is a 48 yo female with a PMH of PJ,  SC trait, PUD, malabsorption s/p gastric bypass (x3), PCOS  s/p hysterectomy, htn, migraines, depression, lichen planus, syringomyelia and  was referred to benign hematology for consultation of anemia hx receiving IV iron. Referred by Dr. Alcala.     Today, patient presents for followup via phone.      S/p 3 doses IV iron and 1 blood transfusion 3/2023. Had itchy rash with blood transfusion but pt reports she has had issues with itchy rashes (intermittent) for years - was given benadryl and steroids IV  w/improvement. Had itch rashes with IV Venofer - PO benadryl did not improve so was given IV Benadryl 25mg with good relief. Does tell me today that she had significant itching even with 25mg IV benadryl with last IV iron - went home and did not tell anyone - kept taking PO benadryl and eventually resolved.      She is feeling tired again. No abnormal bleeding, no melena. Brusies easily but nothing severe.      Recent hospitalization for complications s/p liposuction 8/2023.     B12 injections monthly at home w/o issue. Never started folic acid.      Recall - Chronic GI issues - since 3rd gastric sx, stools are liquid and yellow or sludge - has bouts of pancreatitis. Also has hemorrhoids but do not bleed much. Has not followed with GI for many years.  Has had multiple blood transfusions in the past  per pt  and has had IV iron in past.     Has had surgery in past w/o issue.      SH: Diet - reg, Tobacco - no , ETOH - no , Rec drug use - no . Radiation exposure - no .  PSH: gastric bypass x3, hysterectomy, gallbladder  FH: hep c, breast ca, ovarian ca.  Daughter with anemia   Meds: see list           Objective   BSA: There is no height or weight on file to calculate BSA.  There were no vitals taken for this visit.     has a past medical history of Excessive and frequent menstruation with irregular cycle (07/19/2017), Malabsorption of iron ([facility]CC) (11/15/2023), Personal history of diseases of the blood and blood-forming organs and certain disorders involving the immune mechanism, Personal history of other diseases of the digestive system, Personal history of other diseases of the female genital tract, Personal history of other diseases of the female genital tract (06/30/2017), Personal history of other mental and behavioral disorders (02/13/2017), and Syringomyelia and syringobulbia (Multi) (02/16/2017).   has a past surgical history that includes Stomach surgery (01/20/2016); Other surgical history (01/20/2016); Other surgical history (07/21/2017); Other surgical history (08/02/2022); Tubal ligation (01/30/2017); Cholecystectomy (01/27/2016); Laparoscopy diagnostic / biopsy / aspiration / lysis (04/11/2017); and Gastric bypass (02/15/2016).  Family History   Problem Relation Name Age of Onset    Liver disease Mother      Other (HEPTIC CIRRHOIS) Mother      Other (HEP C CHRONIC) Mother      Emphysema Father      Diabetes Father      Breast cancer Father's Sister      BRCA 1 Father's Sister      Ovarian cancer Father's Sister       Moisés Lambert  reports that she has never smoked. She has never used smokeless tobacco.  She  reports that she does not currently use alcohol.  She  reports no history of drug use.    WBC   Date/Time Value Ref Range Status   11/21/2024 08:58 AM 4.6 4.4 - 11.3 x10*3/uL Final  "  05/10/2024 01:16 PM 4.7 4.4 - 11.3 x10*3/uL Final   11/09/2023 12:15 PM 3.5 (L) 4.4 - 11.3 x10*3/uL Final     nRBC   Date Value Ref Range Status   11/21/2024   Final     Comment:     Not Measured   05/10/2024   Final     Comment:     Not Measured   11/09/2023   Final     Comment:     Not Measured     RBC   Date Value Ref Range Status   11/21/2024 4.25 4.00 - 5.20 x10*6/uL Final   05/10/2024 4.62 4.00 - 5.20 x10*6/uL Final   11/09/2023 4.41 4.00 - 5.20 x10*6/uL Final     Hemoglobin   Date Value Ref Range Status   11/21/2024 12.2 12.0 - 16.0 g/dL Final   05/10/2024 13.1 12.0 - 16.0 g/dL Final   11/09/2023 12.4 12.0 - 16.0 g/dL Final     Hematocrit   Date Value Ref Range Status   11/21/2024 36.0 36.0 - 46.0 % Final   05/10/2024 40.2 36.0 - 46.0 % Final   11/09/2023 38.0 36.0 - 46.0 % Final     MCV   Date/Time Value Ref Range Status   11/21/2024 08:58 AM 85 80 - 100 fL Final   05/10/2024 01:16 PM 87 80 - 100 fL Final   11/09/2023 12:15 PM 86 80 - 100 fL Final     MCH   Date/Time Value Ref Range Status   11/21/2024 08:58 AM 28.7 26.0 - 34.0 pg Final   05/10/2024 01:16 PM 28.4 26.0 - 34.0 pg Final   11/09/2023 12:15 PM 28.1 26.0 - 34.0 pg Final     MCHC   Date/Time Value Ref Range Status   11/21/2024 08:58 AM 33.9 32.0 - 36.0 g/dL Final   05/10/2024 01:16 PM 32.6 32.0 - 36.0 g/dL Final   11/09/2023 12:15 PM 32.6 32.0 - 36.0 g/dL Final     RDW   Date/Time Value Ref Range Status   11/21/2024 08:58 AM 12.1 11.5 - 14.5 % Final   05/10/2024 01:16 PM 11.6 11.5 - 14.5 % Final   11/09/2023 12:15 PM 13.3 11.5 - 14.5 % Final     Platelets   Date/Time Value Ref Range Status   11/21/2024 08:58  150 - 450 x10*3/uL Final   05/10/2024 01:16  150 - 450 x10*3/uL Final   11/09/2023 12:15  150 - 450 x10*3/uL Final     No results found for: \"MPV\"  Neutrophils %   Date/Time Value Ref Range Status   11/21/2024 08:58 AM 60.6 40.0 - 80.0 % Final   05/10/2024 01:16 PM 57.1 40.0 - 80.0 % Final   11/09/2023 12:15 PM 49.3 40.0 - " 80.0 % Final     Immature Granulocytes %, Automated   Date/Time Value Ref Range Status   11/21/2024 08:58 AM 0.2 0.0 - 0.9 % Final     Comment:     Immature Granulocyte Count (IG) includes promyelocytes, myelocytes and metamyelocytes but does not include bands. Percent differential counts (%) should be interpreted in the context of the absolute cell counts (cells/UL).   05/10/2024 01:16 PM 0.4 0.0 - 0.9 % Final     Comment:     Immature Granulocyte Count (IG) includes promyelocytes, myelocytes and metamyelocytes but does not include bands. Percent differential counts (%) should be interpreted in the context of the absolute cell counts (cells/UL).   11/09/2023 12:15 PM 0.3 0.0 - 0.9 % Final     Comment:     Immature Granulocyte Count (IG) includes promyelocytes, myelocytes and metamyelocytes but does not include bands. Percent differential counts (%) should be interpreted in the context of the absolute cell counts (cells/UL).     Lymphocytes %   Date/Time Value Ref Range Status   11/21/2024 08:58 AM 29.8 13.0 - 44.0 % Final   05/10/2024 01:16 PM 32.1 13.0 - 44.0 % Final   11/09/2023 12:15 PM 39.8 13.0 - 44.0 % Final     Monocytes %   Date/Time Value Ref Range Status   11/21/2024 08:58 AM 8.3 2.0 - 10.0 % Final   05/10/2024 01:16 PM 8.9 2.0 - 10.0 % Final   11/09/2023 12:15 PM 9.2 2.0 - 10.0 % Final     Eosinophils %   Date/Time Value Ref Range Status   11/21/2024 08:58 AM 0.7 0.0 - 6.0 % Final   05/10/2024 01:16 PM 1.1 0.0 - 6.0 % Final   11/09/2023 12:15 PM 1.1 0.0 - 6.0 % Final     Basophils %   Date/Time Value Ref Range Status   11/21/2024 08:58 AM 0.4 0.0 - 2.0 % Final   05/10/2024 01:16 PM 0.4 0.0 - 2.0 % Final   11/09/2023 12:15 PM 0.3 0.0 - 2.0 % Final     Neutrophils Absolute   Date/Time Value Ref Range Status   11/21/2024 08:58 AM 2.79 1.20 - 7.70 x10*3/uL Final     Comment:     Percent differential counts (%) should be interpreted in the context of the absolute cell counts (cells/uL).   05/10/2024 01:16 PM  2.69 1.20 - 7.70 x10*3/uL Final     Comment:     Percent differential counts (%) should be interpreted in the context of the absolute cell counts (cells/uL).   11/09/2023 12:15 PM 1.72 1.20 - 7.70 x10*3/uL Final     Comment:     Percent differential counts (%) should be interpreted in the context of the absolute cell counts (cells/uL).     Immature Granulocytes Absolute, Automated   Date/Time Value Ref Range Status   11/21/2024 08:58 AM 0.01 0.00 - 0.70 x10*3/uL Final   05/10/2024 01:16 PM 0.02 0.00 - 0.70 x10*3/uL Final   11/09/2023 12:15 PM 0.01 0.00 - 0.70 x10*3/uL Final     Lymphocytes Absolute   Date/Time Value Ref Range Status   11/21/2024 08:58 AM 1.37 1.20 - 4.80 x10*3/uL Final   05/10/2024 01:16 PM 1.51 1.20 - 4.80 x10*3/uL Final   11/09/2023 12:15 PM 1.39 1.20 - 4.80 x10*3/uL Final     Monocytes Absolute   Date/Time Value Ref Range Status   11/21/2024 08:58 AM 0.38 0.10 - 1.00 x10*3/uL Final   05/10/2024 01:16 PM 0.42 0.10 - 1.00 x10*3/uL Final   11/09/2023 12:15 PM 0.32 0.10 - 1.00 x10*3/uL Final     Eosinophils Absolute   Date/Time Value Ref Range Status   11/21/2024 08:58 AM 0.03 0.00 - 0.70 x10*3/uL Final   05/10/2024 01:16 PM 0.05 0.00 - 0.70 x10*3/uL Final   11/09/2023 12:15 PM 0.04 0.00 - 0.70 x10*3/uL Final     Basophils Absolute   Date/Time Value Ref Range Status   11/21/2024 08:58 AM 0.02 0.00 - 0.10 x10*3/uL Final   05/10/2024 01:16 PM 0.02 0.00 - 0.10 x10*3/uL Final   11/09/2023 12:15 PM 0.01 0.00 - 0.10 x10*3/uL Final     Assessment/Plan    Patient is a 48 yo female with a PMH of PJ, PUD,  malabsorption s/p gastric bypass, PCOS s/p hysterectomy, htn, migraines,  depression, lichen planus, syringomyelia and was referred to benign hematology for consultation of anemia - hx receiving IV iron. Referred by  Dr. Alcala.     Today, patient presents for telephone follow up     I reviewed patient's chart including but not limited to labs, imaging, surgical/procedure notes, pathology, hospital notes,  doctor's notes. Has had chronic mild to moderate anemia essentially since 2008. Microcytic to normocytic anemia. Known PJ, b12  deficiency, and malabsorption s/p gastric bypass. HX AUB but none since hysterectomy. Very mild hemorrhoid bleeding at times, no other abnormal bruising or bleeding. Taking oral iron but increases GI distress, pain, constipation and reports has trialed  in the past multiple times w/o any change in anemia/iron studies.     Discussed will likely require lifelong IV iron d/t malabsorption s/p gastric bypass. Will likely also require B12 and folate supplementation lifelong. She has been stable with CBC for over a year.       PLAN:  1). Patient can follow up for routine CBC checks and nutritional studies.  2). FUV  12mo with labs 1 week before appt (cbcd, iron studies, b12, folate). Labs in EMR. Will discuss lab results/recommendations at next appt unless something needs immediate follow-up   2. Continue Vit B12 1mg SQ q4wks at home.   3. IV Venofer prn. Premed with Benadryl.  4. F/U with PCP about rashes     I had an extensive discussion with the patient regarding the diagnosis and discussed the plan of therapy, including general considerations regarding side effects and outcomes. Pt understood and gave appropriate teach back about the plan of care. All questions  were answered to the patient's satisfaction. The patient is instructed to contact us at any time if questions or problems arise. Thank you for the opportunity to participate in the care of this very pleasant patient.                Rosanne M Casal, APRN-CNP, DNP

## 2025-04-03 DIAGNOSIS — J02.9 SORE THROAT: ICD-10-CM

## 2025-04-03 RX ORDER — CHLORHEXIDINE GLUCONATE ORAL RINSE 1.2 MG/ML
15 SOLUTION DENTAL AS NEEDED
Qty: 473 ML | Refills: 0 | Status: SHIPPED | OUTPATIENT
Start: 2025-04-03 | End: 2025-04-04 | Stop reason: SDUPTHER

## 2025-04-04 DIAGNOSIS — J02.9 SORE THROAT: ICD-10-CM

## 2025-04-04 RX ORDER — CHLORHEXIDINE GLUCONATE ORAL RINSE 1.2 MG/ML
15 SOLUTION DENTAL 2 TIMES DAILY PRN
Qty: 473 ML | Refills: 0 | Status: SHIPPED | OUTPATIENT
Start: 2025-04-04

## 2025-04-21 ENCOUNTER — APPOINTMENT (OUTPATIENT)
Dept: PRIMARY CARE | Facility: CLINIC | Age: 50
End: 2025-04-21
Payer: COMMERCIAL

## 2025-05-27 ENCOUNTER — APPOINTMENT (OUTPATIENT)
Dept: PRIMARY CARE | Facility: CLINIC | Age: 50
End: 2025-05-27
Payer: COMMERCIAL

## 2025-08-22 ENCOUNTER — TELEPHONE (OUTPATIENT)
Dept: ADMISSION | Facility: HOSPITAL | Age: 50
End: 2025-08-22
Payer: COMMERCIAL

## 2025-08-23 DIAGNOSIS — E53.8 VITAMIN B12 DEFICIENCY: Primary | ICD-10-CM

## 2025-08-23 DIAGNOSIS — D50.8 OTHER IRON DEFICIENCY ANEMIA: Chronic | ICD-10-CM

## 2025-08-23 DIAGNOSIS — E55.9 VITAMIN D DEFICIENCY: ICD-10-CM

## 2025-08-23 DIAGNOSIS — R53.82 CHRONIC FATIGUE: ICD-10-CM

## 2025-09-03 ENCOUNTER — LAB (OUTPATIENT)
Dept: LAB | Facility: CLINIC | Age: 50
End: 2025-09-03
Payer: COMMERCIAL

## 2025-09-03 DIAGNOSIS — R53.82 CHRONIC FATIGUE: ICD-10-CM

## 2025-09-03 DIAGNOSIS — D50.8 OTHER IRON DEFICIENCY ANEMIA: Chronic | ICD-10-CM

## 2025-09-03 DIAGNOSIS — E55.9 VITAMIN D DEFICIENCY: ICD-10-CM

## 2025-09-03 DIAGNOSIS — E53.8 VITAMIN B12 DEFICIENCY: ICD-10-CM

## 2025-09-03 LAB
25(OH)D3 SERPL-MCNC: 11 NG/ML (ref 30–100)
ALBUMIN SERPL BCP-MCNC: 5 G/DL (ref 3.4–5)
ALP SERPL-CCNC: 61 U/L (ref 33–110)
ALT SERPL W P-5'-P-CCNC: 11 U/L (ref 7–45)
ANION GAP SERPL CALC-SCNC: 15 MMOL/L (ref 10–20)
AST SERPL W P-5'-P-CCNC: 14 U/L (ref 9–39)
BASOPHILS # BLD AUTO: 0.03 X10*3/UL (ref 0–0.1)
BASOPHILS NFR BLD AUTO: 0.6 %
BILIRUB SERPL-MCNC: 0.5 MG/DL (ref 0–1.2)
BUN SERPL-MCNC: 9 MG/DL (ref 6–23)
CALCIUM SERPL-MCNC: 10.1 MG/DL (ref 8.6–10.6)
CHLORIDE SERPL-SCNC: 105 MMOL/L (ref 98–107)
CO2 SERPL-SCNC: 23 MMOL/L (ref 21–32)
CREAT SERPL-MCNC: 0.52 MG/DL (ref 0.5–1.05)
EGFRCR SERPLBLD CKD-EPI 2021: >90 ML/MIN/1.73M*2
EOSINOPHIL # BLD AUTO: 0.03 X10*3/UL (ref 0–0.7)
EOSINOPHIL NFR BLD AUTO: 0.6 %
ERYTHROCYTE [DISTWIDTH] IN BLOOD BY AUTOMATED COUNT: 12 % (ref 11.5–14.5)
FOLATE SERPL-MCNC: 13 NG/ML
GLUCOSE SERPL-MCNC: 98 MG/DL (ref 74–99)
HCT VFR BLD AUTO: 39.8 % (ref 36–46)
HGB BLD-MCNC: 13.4 G/DL (ref 12–16)
IMM GRANULOCYTES # BLD AUTO: 0.01 X10*3/UL (ref 0–0.7)
IMM GRANULOCYTES NFR BLD AUTO: 0.2 % (ref 0–0.9)
IRON SATN MFR SERPL: 19 % (ref 25–45)
IRON SERPL-MCNC: 69 UG/DL (ref 35–150)
LYMPHOCYTES # BLD AUTO: 1.18 X10*3/UL (ref 1.2–4.8)
LYMPHOCYTES NFR BLD AUTO: 21.8 %
MCH RBC QN AUTO: 27.8 PG (ref 26–34)
MCHC RBC AUTO-ENTMCNC: 33.7 G/DL (ref 32–36)
MCV RBC AUTO: 83 FL (ref 80–100)
MONOCYTES # BLD AUTO: 0.34 X10*3/UL (ref 0.1–1)
MONOCYTES NFR BLD AUTO: 6.3 %
NEUTROPHILS # BLD AUTO: 3.83 X10*3/UL (ref 1.2–7.7)
NEUTROPHILS NFR BLD AUTO: 70.5 %
PLATELET # BLD AUTO: 292 X10*3/UL (ref 150–450)
POTASSIUM SERPL-SCNC: 3.7 MMOL/L (ref 3.5–5.3)
PROT SERPL-MCNC: 7.5 G/DL (ref 6.4–8.2)
RBC # BLD AUTO: 4.82 X10*6/UL (ref 4–5.2)
SODIUM SERPL-SCNC: 139 MMOL/L (ref 136–145)
TIBC SERPL-MCNC: 355 UG/DL (ref 240–445)
UIBC SERPL-MCNC: 286 UG/DL (ref 110–370)
VIT B12 SERPL-MCNC: 269 PG/ML (ref 211–911)
WBC # BLD AUTO: 5.4 X10*3/UL (ref 4.4–11.3)

## 2025-09-03 PROCEDURE — 36415 COLL VENOUS BLD VENIPUNCTURE: CPT

## 2025-09-03 PROCEDURE — 82607 VITAMIN B-12: CPT

## 2025-09-03 PROCEDURE — 83540 ASSAY OF IRON: CPT

## 2025-09-03 PROCEDURE — 85025 COMPLETE CBC W/AUTO DIFF WBC: CPT

## 2025-09-03 PROCEDURE — 84075 ASSAY ALKALINE PHOSPHATASE: CPT

## 2025-09-03 PROCEDURE — 82746 ASSAY OF FOLIC ACID SERUM: CPT

## 2025-09-03 PROCEDURE — 82306 VITAMIN D 25 HYDROXY: CPT

## 2025-10-07 ENCOUNTER — APPOINTMENT (OUTPATIENT)
Dept: PRIMARY CARE | Facility: CLINIC | Age: 50
End: 2025-10-07
Payer: COMMERCIAL